# Patient Record
Sex: FEMALE | Race: WHITE | NOT HISPANIC OR LATINO | Employment: OTHER | URBAN - METROPOLITAN AREA
[De-identification: names, ages, dates, MRNs, and addresses within clinical notes are randomized per-mention and may not be internally consistent; named-entity substitution may affect disease eponyms.]

---

## 2022-10-01 ENCOUNTER — OFFICE VISIT (OUTPATIENT)
Dept: URGENT CARE | Facility: CLINIC | Age: 87
End: 2022-10-01
Payer: MEDICARE

## 2022-10-01 VITALS
BODY MASS INDEX: 24.19 KG/M2 | DIASTOLIC BLOOD PRESSURE: 66 MMHG | HEART RATE: 104 BPM | HEIGHT: 59 IN | TEMPERATURE: 99 F | RESPIRATION RATE: 20 BRPM | SYSTOLIC BLOOD PRESSURE: 157 MMHG | OXYGEN SATURATION: 98 % | WEIGHT: 120 LBS

## 2022-10-01 DIAGNOSIS — M25.511 ACUTE PAIN OF RIGHT SHOULDER: ICD-10-CM

## 2022-10-01 DIAGNOSIS — M54.2 CERVICAL PAIN (NECK): ICD-10-CM

## 2022-10-01 DIAGNOSIS — M77.8 SHOULDER TENDONITIS, RIGHT: Primary | ICD-10-CM

## 2022-10-01 DIAGNOSIS — R29.6 FALLS FREQUENTLY: ICD-10-CM

## 2022-10-01 PROCEDURE — 99214 PR OFFICE/OUTPT VISIT, EST, LEVL IV, 30-39 MIN: ICD-10-PCS | Mod: ,,, | Performed by: FAMILY MEDICINE

## 2022-10-01 PROCEDURE — 99214 OFFICE O/P EST MOD 30 MIN: CPT | Mod: ,,, | Performed by: FAMILY MEDICINE

## 2022-10-01 RX ORDER — DILTIAZEM HYDROCHLORIDE 360 MG/1
360 CAPSULE, EXTENDED RELEASE ORAL DAILY
COMMUNITY

## 2022-10-01 RX ORDER — FUROSEMIDE 10 MG/ML
SOLUTION ORAL
COMMUNITY

## 2022-10-01 RX ORDER — SERTRALINE HYDROCHLORIDE 100 MG/1
100 TABLET, FILM COATED ORAL NIGHTLY
COMMUNITY

## 2022-10-01 RX ORDER — PANTOPRAZOLE SODIUM 20 MG/1
20 TABLET, DELAYED RELEASE ORAL
COMMUNITY

## 2022-10-01 RX ORDER — KETOROLAC TROMETHAMINE 10 MG/1
10 TABLET, FILM COATED ORAL EVERY 6 HOURS PRN
Qty: 10 TABLET | Refills: 0 | Status: SHIPPED | OUTPATIENT
Start: 2022-10-01 | End: 2022-10-06

## 2022-10-01 RX ORDER — ATORVASTATIN CALCIUM 10 MG/1
10 TABLET, FILM COATED ORAL NIGHTLY
COMMUNITY

## 2022-10-01 NOTE — PROGRESS NOTES
Patient ID: Yoli Villarreal is a 89 y.o. female.  Chief Complaint: Fall (Pt fell Wednesday, fell backwards from standing up on hard floor, fell on back and pt says they believe they may have hit their head on the ground, shoulders are sore, right side is more sore than the left, voice is hoarse.  Pt took Advil. )    HPI:   Patient presents here today for above reason.     Here today in the care and presence of her daughter.   Patient reports pain, tenderness and decreased range of motion of the right shoulder.  Also upper back/cervical neck pain.    Goes on to express that she endured a accidental fall Wednesday of this past week.  She expresses that she does have a history of frequent falls typically falling forward however this time she fell backwards.  Believes that she landed on the grass.    She reports that she has not had any neurological changes/changes level of consciousness/changes of thought since the time of her injury.  Primary concern is her right upper extremity has the left upper extremity has improved/resolved.  Curious if anything is broken or dislocated etc..      Past Medical History:  Past Medical History:   Diagnosis Date    Acid reflux     High cholesterol     HTN (hypertension)     Unspecified macular degeneration      Past Surgical History:   Procedure Laterality Date    ADENOIDECTOMY      APPENDECTOMY       SECTION      HYSTERECTOMY      NEPHRECTOMY      TONSILLECTOMY       Review of patient's allergies indicates:   Allergen Reactions    Iodine      IVP reaction     Current Outpatient Medications on File Prior to Visit   Medication Sig Dispense Refill    atorvastatin (LIPITOR) 10 MG tablet Take 10 mg by mouth once daily. Dosage unsure.      diltiaZEM (TIAZAC) 360 MG Cs24 Take 360 mg by mouth once daily.      furosemide (LASIX) 10 mg/mL (alcohol free) solution Take by mouth. Dosage unsure      pantoprazole (PROTONIX) 20 MG tablet Take 20 mg by mouth once daily. Dosage unsure.       "sertraline (ZOLOFT) 100 MG tablet Take 100 mg by mouth once daily. Dosage unsure.       No current facility-administered medications on file prior to visit.     Social History     Socioeconomic History    Marital status:    Tobacco Use    Smoking status: Never    Smokeless tobacco: Never   Substance and Sexual Activity    Alcohol use: Never    Drug use: Never     Family History   Problem Relation Age of Onset    Ovarian cancer Mother     No Known Problems Father     No Known Problems Sister     No Known Problems Brother      ROS:   Review of Systems   Constitutional: Negative.    HENT: Negative.     Respiratory: Negative.     Cardiovascular: Negative.    Genitourinary: Negative.    Musculoskeletal:  Positive for back pain, joint swelling and neck pain.   Skin: Negative.    Neurological: Negative.    Psychiatric/Behavioral: Negative.       Vitals/PE:   BP (!) 157/66   Pulse 104   Temp 98.6 °F (37 °C)   Resp 20   Ht 4' 11" (1.499 m)   Wt 54.4 kg (120 lb)   SpO2 98%   BMI 24.24 kg/m²   Physical Exam  Vitals and nursing note reviewed.   Constitutional:       General: She is not in acute distress.     Appearance: Normal appearance. She is not ill-appearing, toxic-appearing or diaphoretic.   Cardiovascular:      Rate and Rhythm: Normal rate and regular rhythm.      Pulses: Normal pulses.      Heart sounds: Normal heart sounds.   Pulmonary:      Effort: Pulmonary effort is normal.      Breath sounds: Normal breath sounds.   Musculoskeletal:         General: Tenderness and signs of injury present.      Right shoulder: Tenderness present. Decreased range of motion.      Right upper arm: Tenderness present.      Comments: Right Shoulder. + bill, + Neer's.    Skin:     General: Skin is warm and dry.   Neurological:      Mental Status: She is alert and oriented to person, place, and time. Mental status is at baseline.   Psychiatric:         Mood and Affect: Mood normal.         Behavior: Behavior normal.        "  Thought Content: Thought content normal.       Assessment/Plan:   Shoulder tendonitis, right  Recommend Physical therapy.   Recommend the use of topical antiinflammatories ( biofreeze etc..).     Acute pain of right shoulder  -     X-ray Shoulder 2 or More Views Right; Future; Expected date: 10/01/2022  My interpretation of x-ray and shoulder no obvious dislocation/fracture.  Pain likely result of shoulder tendinitis as above.  Cervical pain (neck)  -     X-Ray Cervical Spine 2 or 3 Views; Future; Expected date: 10/01/2022  My interpretation of cervical x-ray C-spine positive for degenerative disc disease.  No obvious/new acute fractures etc..  Falls frequently   Fall precautions.  Both patient and daughter verbalized understanding.  Orders Placed This Encounter   Procedures    X-Ray Cervical Spine 2 or 3 Views    X-ray Shoulder 2 or More Views Right       Education and counseling done face to face regarding medical conditions and plan. Contact office if new symptoms develop. Should any symptoms ever significantly worsen seek emergency medical attention/go to ER. Follow up at least yearly for wellness or sooner PRN. Nurse to call patient with any results. The patient is receptive, expresses understanding and is agreeable to plan. All questions have been answered.  Follow up if symptoms worsen or fail to improve.

## 2023-05-23 ENCOUNTER — PATIENT MESSAGE (OUTPATIENT)
Dept: RESEARCH | Facility: HOSPITAL | Age: 88
End: 2023-05-23
Payer: MEDICARE

## 2023-12-22 ENCOUNTER — OFFICE VISIT (OUTPATIENT)
Dept: URGENT CARE | Facility: CLINIC | Age: 88
End: 2023-12-22
Payer: MEDICARE

## 2023-12-22 VITALS
BODY MASS INDEX: 24.19 KG/M2 | OXYGEN SATURATION: 96 % | DIASTOLIC BLOOD PRESSURE: 61 MMHG | HEART RATE: 68 BPM | RESPIRATION RATE: 18 BRPM | SYSTOLIC BLOOD PRESSURE: 179 MMHG | HEIGHT: 59 IN | TEMPERATURE: 98 F | WEIGHT: 120 LBS

## 2023-12-22 DIAGNOSIS — M54.9 BACK PAIN, UNSPECIFIED BACK LOCATION, UNSPECIFIED BACK PAIN LATERALITY, UNSPECIFIED CHRONICITY: Primary | ICD-10-CM

## 2023-12-22 PROCEDURE — 99213 OFFICE O/P EST LOW 20 MIN: CPT | Mod: ,,, | Performed by: NURSE PRACTITIONER

## 2023-12-22 PROCEDURE — 99213 PR OFFICE/OUTPT VISIT, EST, LEVL III, 20-29 MIN: ICD-10-PCS | Mod: ,,, | Performed by: NURSE PRACTITIONER

## 2023-12-22 NOTE — PROGRESS NOTES
"Subjective:      Patient ID: Yoli Villarreal is a 89 y.o. female.    Vitals:  height is 4' 11" (1.499 m) and weight is 54.4 kg (120 lb). Her temperature is 98.4 °F (36.9 °C). Her blood pressure is 179/61 (abnormal) and her pulse is 68. Her respiration is 18 and oxygen saturation is 96%.     Chief Complaint: Fall (Pt presents to clinic with pain in her lower back from a fall. Symptomatic since last night.)    This is a 89-year-old female presents to urgent care with complaints of mid lower back pain after suffering a fall on her hardwood floors last night.  Denies any numbness or tingling distally.  She denies any changes to bowel habits.  States only falling on her back last night.  Denies any other known injuries.    Musculoskeletal:  Positive for trauma, back pain, muscle ache and history of spine disorder.    Objective:     Physical Exam   Constitutional: She is oriented to person, place, and time. She appears well-developed. She is cooperative.  Non-toxic appearance. She does not appear ill. No distress.   HENT:   Head: Normocephalic and atraumatic.   Ears:   Right Ear: Hearing, tympanic membrane, external ear and ear canal normal.   Left Ear: Hearing, tympanic membrane, external ear and ear canal normal.   Nose: Nose normal. No mucosal edema, rhinorrhea or nasal deformity. No epistaxis. Right sinus exhibits no maxillary sinus tenderness and no frontal sinus tenderness. Left sinus exhibits no maxillary sinus tenderness and no frontal sinus tenderness.   Mouth/Throat: Uvula is midline, oropharynx is clear and moist and mucous membranes are normal. No trismus in the jaw. Normal dentition. No uvula swelling. No oropharyngeal exudate, posterior oropharyngeal edema or posterior oropharyngeal erythema.   Eyes: Conjunctivae and lids are normal. No scleral icterus.   Neck: Trachea normal and phonation normal. Neck supple. No edema present. No erythema present. No neck rigidity present.   Cardiovascular: Normal rate, " regular rhythm, normal heart sounds and normal pulses.   Pulmonary/Chest: Effort normal and breath sounds normal. No respiratory distress. She has no decreased breath sounds. She has no rhonchi.   Abdominal: Normal appearance.   Musculoskeletal: Normal range of motion.         General: No deformity. Normal range of motion.      Comments: No TTP elicited upon palpation their entirety of lower back and sacroiliac and pelvic area.  No signs of any deformity noted.   Neurological: She is alert and oriented to person, place, and time. She exhibits normal muscle tone. Coordination normal.   Skin: Skin is warm, dry, intact, not diaphoretic and not pale.   Psychiatric: Her speech is normal and behavior is normal. Judgment and thought content normal.   Nursing note and vitals reviewed.      Assessment:     1. Back pain, unspecified back location, unspecified back pain laterality, unspecified chronicity        Plan:   We will we will await final reading of x-ray of spine and coccyx  Patient will use prescribed tramadol as needed for pain along with warm compresses and monitor for any worsening signs of pain or issues.    Back pain, unspecified back location, unspecified back pain laterality, unspecified chronicity  -     XR SACRUM AND COCCYX; Future; Expected date: 12/22/2023  -     XR LUMBAR SPINE 2 OR 3 VIEWS; Future; Expected date: 12/22/2023

## 2023-12-22 NOTE — PATIENT INSTRUCTIONS
We will call with the final results of the x-ray of the spine.    As we discussed use your prescribed tramadol as needed for pain   Use warm compresses or heating pad to the lower back area.  Monitor for any worsening issues and please call with any questions or concerns.

## 2024-04-01 ENCOUNTER — HOSPITAL ENCOUNTER (INPATIENT)
Facility: HOSPITAL | Age: 89
LOS: 1 days | Discharge: HOME HEALTH | DRG: 281 | End: 2024-04-02
Attending: STUDENT IN AN ORGANIZED HEALTH CARE EDUCATION/TRAINING PROGRAM | Admitting: HOSPITALIST
Payer: MEDICARE

## 2024-04-01 DIAGNOSIS — R42 DIZZINESS: ICD-10-CM

## 2024-04-01 DIAGNOSIS — S92.352A DISPLACED FRACTURE OF FIFTH METATARSAL BONE, LEFT FOOT, INITIAL ENCOUNTER FOR CLOSED FRACTURE: ICD-10-CM

## 2024-04-01 DIAGNOSIS — R29.6 RECURRENT FALLS: Primary | ICD-10-CM

## 2024-04-01 DIAGNOSIS — R42 DIZZY: ICD-10-CM

## 2024-04-01 DIAGNOSIS — R79.89 ELEVATED TROPONIN: ICD-10-CM

## 2024-04-01 LAB
ALBUMIN SERPL-MCNC: 3.7 G/DL (ref 3.4–4.8)
ALBUMIN/GLOB SERPL: 1.3 RATIO (ref 1.1–2)
ALP SERPL-CCNC: 71 UNIT/L (ref 40–150)
ALT SERPL-CCNC: 11 UNIT/L (ref 0–55)
APPEARANCE UR: CLEAR
AST SERPL-CCNC: 17 UNIT/L (ref 5–34)
BACTERIA #/AREA URNS AUTO: NORMAL /HPF
BASOPHILS # BLD AUTO: 0.02 X10(3)/MCL
BASOPHILS NFR BLD AUTO: 0.5 %
BILIRUB SERPL-MCNC: 0.4 MG/DL
BILIRUB UR QL STRIP.AUTO: NEGATIVE
BUN SERPL-MCNC: 19.7 MG/DL (ref 9.8–20.1)
CALCIUM SERPL-MCNC: 9.4 MG/DL (ref 8.4–10.2)
CHLORIDE SERPL-SCNC: 109 MMOL/L (ref 98–111)
CO2 SERPL-SCNC: 22 MMOL/L (ref 23–31)
COLOR UR AUTO: COLORLESS
CREAT SERPL-MCNC: 0.91 MG/DL (ref 0.55–1.02)
EOSINOPHIL # BLD AUTO: 0.01 X10(3)/MCL (ref 0–0.9)
EOSINOPHIL NFR BLD AUTO: 0.2 %
ERYTHROCYTE [DISTWIDTH] IN BLOOD BY AUTOMATED COUNT: 15.9 % (ref 11.5–17)
GFR SERPLBLD CREATININE-BSD FMLA CKD-EPI: 57 MLS/MIN/1.73/M2
GLOBULIN SER-MCNC: 2.9 GM/DL (ref 2.4–3.5)
GLUCOSE SERPL-MCNC: 106 MG/DL (ref 75–121)
GLUCOSE UR QL STRIP.AUTO: NORMAL
HCT VFR BLD AUTO: 36 % (ref 37–47)
HGB BLD-MCNC: 11.4 G/DL (ref 12–16)
IMM GRANULOCYTES # BLD AUTO: 0.02 X10(3)/MCL (ref 0–0.04)
IMM GRANULOCYTES NFR BLD AUTO: 0.5 %
INR PPP: 1.2
KETONES UR QL STRIP.AUTO: NEGATIVE
LEUKOCYTE ESTERASE UR QL STRIP.AUTO: NEGATIVE
LYMPHOCYTES # BLD AUTO: 0.89 X10(3)/MCL (ref 0.6–4.6)
LYMPHOCYTES NFR BLD AUTO: 21.5 %
MCH RBC QN AUTO: 28.3 PG (ref 27–31)
MCHC RBC AUTO-ENTMCNC: 31.7 G/DL (ref 33–36)
MCV RBC AUTO: 89.3 FL (ref 80–94)
MONOCYTES # BLD AUTO: 1.28 X10(3)/MCL (ref 0.1–1.3)
MONOCYTES NFR BLD AUTO: 30.9 %
NEUTROPHILS # BLD AUTO: 1.92 X10(3)/MCL (ref 2.1–9.2)
NEUTROPHILS NFR BLD AUTO: 46.4 %
NITRITE UR QL STRIP.AUTO: NEGATIVE
NRBC BLD AUTO-RTO: 0 %
PH UR STRIP.AUTO: 7 [PH]
PLATELET # BLD AUTO: 110 X10(3)/MCL (ref 130–400)
PLATELETS.RETICULATED NFR BLD AUTO: 6.4 % (ref 0.9–11.2)
PMV BLD AUTO: 12.1 FL (ref 7.4–10.4)
POTASSIUM SERPL-SCNC: 4.3 MMOL/L (ref 3.5–5.1)
PROT SERPL-MCNC: 6.6 GM/DL (ref 5.8–7.6)
PROT UR QL STRIP.AUTO: NEGATIVE
PROTHROMBIN TIME: 15.1 SECONDS (ref 12.5–14.5)
RBC # BLD AUTO: 4.03 X10(6)/MCL (ref 4.2–5.4)
RBC #/AREA URNS AUTO: NORMAL /HPF
RBC UR QL AUTO: NEGATIVE
SODIUM SERPL-SCNC: 140 MMOL/L (ref 132–146)
SP GR UR STRIP.AUTO: 1.01 (ref 1–1.03)
SQUAMOUS #/AREA URNS LPF: NORMAL /HPF
TROPONIN I SERPL-MCNC: 0.04 NG/ML (ref 0–0.04)
TROPONIN I SERPL-MCNC: 0.06 NG/ML (ref 0–0.04)
TROPONIN I SERPL-MCNC: 0.07 NG/ML (ref 0–0.04)
UROBILINOGEN UR STRIP-ACNC: NORMAL
WBC # SPEC AUTO: 4.14 X10(3)/MCL (ref 4.5–11.5)
WBC #/AREA URNS AUTO: NORMAL /HPF

## 2024-04-01 PROCEDURE — 93005 ELECTROCARDIOGRAM TRACING: CPT

## 2024-04-01 PROCEDURE — 25000003 PHARM REV CODE 250: Performed by: HOSPITALIST

## 2024-04-01 PROCEDURE — 85025 COMPLETE CBC W/AUTO DIFF WBC: CPT | Performed by: STUDENT IN AN ORGANIZED HEALTH CARE EDUCATION/TRAINING PROGRAM

## 2024-04-01 PROCEDURE — 27000221 HC OXYGEN, UP TO 24 HOURS

## 2024-04-01 PROCEDURE — 93010 ELECTROCARDIOGRAM REPORT: CPT | Mod: ,,, | Performed by: INTERNAL MEDICINE

## 2024-04-01 PROCEDURE — 63600175 PHARM REV CODE 636 W HCPCS: Performed by: HOSPITALIST

## 2024-04-01 PROCEDURE — 11000001 HC ACUTE MED/SURG PRIVATE ROOM

## 2024-04-01 PROCEDURE — 96360 HYDRATION IV INFUSION INIT: CPT

## 2024-04-01 PROCEDURE — 85610 PROTHROMBIN TIME: CPT | Performed by: STUDENT IN AN ORGANIZED HEALTH CARE EDUCATION/TRAINING PROGRAM

## 2024-04-01 PROCEDURE — 25000003 PHARM REV CODE 250: Performed by: STUDENT IN AN ORGANIZED HEALTH CARE EDUCATION/TRAINING PROGRAM

## 2024-04-01 PROCEDURE — 99285 EMERGENCY DEPT VISIT HI MDM: CPT | Mod: 25

## 2024-04-01 PROCEDURE — 99223 1ST HOSP IP/OBS HIGH 75: CPT | Mod: ,,, | Performed by: ORTHOPAEDIC SURGERY

## 2024-04-01 PROCEDURE — 84484 ASSAY OF TROPONIN QUANT: CPT | Performed by: STUDENT IN AN ORGANIZED HEALTH CARE EDUCATION/TRAINING PROGRAM

## 2024-04-01 PROCEDURE — 80053 COMPREHEN METABOLIC PANEL: CPT | Performed by: STUDENT IN AN ORGANIZED HEALTH CARE EDUCATION/TRAINING PROGRAM

## 2024-04-01 PROCEDURE — 84484 ASSAY OF TROPONIN QUANT: CPT | Performed by: NURSE PRACTITIONER

## 2024-04-01 PROCEDURE — 51702 INSERT TEMP BLADDER CATH: CPT

## 2024-04-01 PROCEDURE — 94760 N-INVAS EAR/PLS OXIMETRY 1: CPT

## 2024-04-01 PROCEDURE — 81001 URINALYSIS AUTO W/SCOPE: CPT | Performed by: STUDENT IN AN ORGANIZED HEALTH CARE EDUCATION/TRAINING PROGRAM

## 2024-04-01 PROCEDURE — 63600175 PHARM REV CODE 636 W HCPCS: Performed by: STUDENT IN AN ORGANIZED HEALTH CARE EDUCATION/TRAINING PROGRAM

## 2024-04-01 RX ORDER — DOCUSATE SODIUM 100 MG/1
100 CAPSULE, LIQUID FILLED ORAL 2 TIMES DAILY PRN
Status: DISCONTINUED | OUTPATIENT
Start: 2024-04-01 | End: 2024-04-01

## 2024-04-01 RX ORDER — MELOXICAM 7.5 MG/1
7.5 TABLET ORAL 2 TIMES DAILY
COMMUNITY

## 2024-04-01 RX ORDER — ACETAMINOPHEN 325 MG/1
650 TABLET ORAL EVERY 4 HOURS PRN
Status: DISCONTINUED | OUTPATIENT
Start: 2024-04-01 | End: 2024-04-02 | Stop reason: HOSPADM

## 2024-04-01 RX ORDER — ATORVASTATIN CALCIUM 10 MG/1
10 TABLET, FILM COATED ORAL DAILY
Status: DISCONTINUED | OUTPATIENT
Start: 2024-04-01 | End: 2024-04-02 | Stop reason: HOSPADM

## 2024-04-01 RX ORDER — SULFAMETHOXAZOLE AND TRIMETHOPRIM 800; 160 MG/1; MG/1
1 TABLET ORAL 2 TIMES DAILY
Status: DISCONTINUED | OUTPATIENT
Start: 2024-04-01 | End: 2024-04-02 | Stop reason: HOSPADM

## 2024-04-01 RX ORDER — POLYETHYLENE GLYCOL 3350 17 G/17G
17 POWDER, FOR SOLUTION ORAL 2 TIMES DAILY PRN
Status: DISCONTINUED | OUTPATIENT
Start: 2024-04-01 | End: 2024-04-02 | Stop reason: HOSPADM

## 2024-04-01 RX ORDER — DILTIAZEM HYDROCHLORIDE 180 MG/1
360 CAPSULE, COATED, EXTENDED RELEASE ORAL DAILY
Status: DISCONTINUED | OUTPATIENT
Start: 2024-04-01 | End: 2024-04-02 | Stop reason: HOSPADM

## 2024-04-01 RX ORDER — ONDANSETRON HYDROCHLORIDE 2 MG/ML
4 INJECTION, SOLUTION INTRAVENOUS EVERY 8 HOURS PRN
Status: DISCONTINUED | OUTPATIENT
Start: 2024-04-01 | End: 2024-04-02 | Stop reason: HOSPADM

## 2024-04-01 RX ORDER — MECLIZINE HCL 12.5 MG 12.5 MG/1
12.5 TABLET ORAL 3 TIMES DAILY PRN
Status: DISCONTINUED | OUTPATIENT
Start: 2024-04-01 | End: 2024-04-02 | Stop reason: HOSPADM

## 2024-04-01 RX ORDER — PANTOPRAZOLE SODIUM 20 MG/1
20 TABLET, DELAYED RELEASE ORAL
Status: DISCONTINUED | OUTPATIENT
Start: 2024-04-01 | End: 2024-04-02 | Stop reason: HOSPADM

## 2024-04-01 RX ORDER — HYDRALAZINE HYDROCHLORIDE 20 MG/ML
10 INJECTION INTRAMUSCULAR; INTRAVENOUS EVERY 4 HOURS PRN
Status: DISCONTINUED | OUTPATIENT
Start: 2024-04-01 | End: 2024-04-02 | Stop reason: HOSPADM

## 2024-04-01 RX ORDER — POLYETHYLENE GLYCOL 3350 17 G/17G
17 POWDER, FOR SOLUTION ORAL 2 TIMES DAILY PRN
Status: DISCONTINUED | OUTPATIENT
Start: 2024-04-01 | End: 2024-04-01

## 2024-04-01 RX ORDER — LABETALOL HYDROCHLORIDE 5 MG/ML
10 INJECTION, SOLUTION INTRAVENOUS EVERY 4 HOURS PRN
Status: DISCONTINUED | OUTPATIENT
Start: 2024-04-01 | End: 2024-04-02 | Stop reason: HOSPADM

## 2024-04-01 RX ORDER — ASPIRIN 325 MG
325 TABLET, DELAYED RELEASE (ENTERIC COATED) ORAL
Status: COMPLETED | OUTPATIENT
Start: 2024-04-01 | End: 2024-04-01

## 2024-04-01 RX ORDER — DOCUSATE SODIUM 100 MG/1
100 CAPSULE, LIQUID FILLED ORAL 2 TIMES DAILY PRN
Status: DISCONTINUED | OUTPATIENT
Start: 2024-04-01 | End: 2024-04-02 | Stop reason: HOSPADM

## 2024-04-01 RX ORDER — TRAMADOL HYDROCHLORIDE 50 MG/1
50 TABLET ORAL EVERY 6 HOURS PRN
Status: DISCONTINUED | OUTPATIENT
Start: 2024-04-01 | End: 2024-04-02 | Stop reason: HOSPADM

## 2024-04-01 RX ORDER — TRAMADOL HYDROCHLORIDE 100 MG/1
50 TABLET, EXTENDED RELEASE ORAL DAILY
Status: ON HOLD | COMMUNITY
End: 2024-06-09 | Stop reason: HOSPADM

## 2024-04-01 RX ORDER — HYDRALAZINE HYDROCHLORIDE 20 MG/ML
10 INJECTION INTRAMUSCULAR; INTRAVENOUS EVERY 4 HOURS PRN
Status: DISCONTINUED | OUTPATIENT
Start: 2024-04-01 | End: 2024-04-01

## 2024-04-01 RX ORDER — SULFAMETHOXAZOLE AND TRIMETHOPRIM 800; 160 MG/1; MG/1
1 TABLET ORAL 2 TIMES DAILY
Status: ON HOLD | COMMUNITY
Start: 2024-03-28 | End: 2024-06-07 | Stop reason: HOSPADM

## 2024-04-01 RX ORDER — MECLIZINE HYDROCHLORIDE 50 MG/1
5 TABLET ORAL EVERY 12 HOURS PRN
COMMUNITY

## 2024-04-01 RX ORDER — SERTRALINE HYDROCHLORIDE 50 MG/1
100 TABLET, FILM COATED ORAL DAILY
Status: DISCONTINUED | OUTPATIENT
Start: 2024-04-01 | End: 2024-04-02 | Stop reason: HOSPADM

## 2024-04-01 RX ORDER — ACETAMINOPHEN 325 MG/1
650 TABLET ORAL EVERY 8 HOURS PRN
Status: DISCONTINUED | OUTPATIENT
Start: 2024-04-01 | End: 2024-04-02 | Stop reason: HOSPADM

## 2024-04-01 RX ADMIN — POLYETHYLENE GLYCOL 3350 17 G: 17 POWDER, FOR SOLUTION ORAL at 01:04

## 2024-04-01 RX ADMIN — PANTOPRAZOLE SODIUM 20 MG: 20 TABLET, DELAYED RELEASE ORAL at 04:04

## 2024-04-01 RX ADMIN — MECLIZINE 12.5 MG: 12.5 TABLET ORAL at 04:04

## 2024-04-01 RX ADMIN — SULFAMETHOXAZOLE AND TRIMETHOPRIM 1 TABLET: 800; 160 TABLET ORAL at 11:04

## 2024-04-01 RX ADMIN — DILTIAZEM HYDROCHLORIDE 360 MG: 180 CAPSULE, COATED, EXTENDED RELEASE ORAL at 11:04

## 2024-04-01 RX ADMIN — ATORVASTATIN CALCIUM 10 MG: 10 TABLET, FILM COATED ORAL at 09:04

## 2024-04-01 RX ADMIN — TRAMADOL HYDROCHLORIDE 50 MG: 50 TABLET, COATED ORAL at 06:04

## 2024-04-01 RX ADMIN — SODIUM CHLORIDE, POTASSIUM CHLORIDE, SODIUM LACTATE AND CALCIUM CHLORIDE 1000 ML: 600; 310; 30; 20 INJECTION, SOLUTION INTRAVENOUS at 06:04

## 2024-04-01 RX ADMIN — SULFAMETHOXAZOLE AND TRIMETHOPRIM 1 TABLET: 800; 160 TABLET ORAL at 09:04

## 2024-04-01 RX ADMIN — HYDRALAZINE HYDROCHLORIDE 10 MG: 20 INJECTION INTRAMUSCULAR; INTRAVENOUS at 01:04

## 2024-04-01 RX ADMIN — SERTRALINE HYDROCHLORIDE 100 MG: 50 TABLET ORAL at 09:04

## 2024-04-01 RX ADMIN — DOCUSATE SODIUM 100 MG: 100 CAPSULE, LIQUID FILLED ORAL at 01:04

## 2024-04-01 RX ADMIN — ASPIRIN 325 MG: 325 TABLET, COATED ORAL at 08:04

## 2024-04-01 NOTE — ED PROVIDER NOTES
Encounter Date: 2024    SCRIBE #1 NOTE: I, Domi Knight, am scribing for, and in the presence of,  Phillip Villagran MD. I have scribed the following portions of the note - Other sections scribed: HPI, ROS and physical.       History     Chief Complaint   Patient presents with    Knee Pain     Per pts daughter she has been falling a lot recently. Pt has hx of vertigo. Pt fell today and hurt her knees. Denies hitting her head when she falls. No blood thinners. No LOC. GCS 14 at baseline.      This is a 89 y/o female with a medical hx of HTN and HLD that presents to the ED via EMS for knee pain s/p fall. Pt reports that recently she is been having more frequent falls; she states that the falls occur after she stands up she becomes dizzy. Pt states that she was seen at [facility] recently for a fall which cause a laceration above her R eyebrow. Pt states that she had a fall today and is now having pain in her L knee. Denies hitting head, LOC. Reports fall, dizziness, L knee pain.     Pt is not on blood thinners.     PCP: John Kulkarni MD    The history is provided by the patient. No  was used.     Review of patient's allergies indicates:   Allergen Reactions    Iodine      IVP reaction     Past Medical History:   Diagnosis Date    Acid reflux     High cholesterol     HTN (hypertension)     Unspecified macular degeneration      Past Surgical History:   Procedure Laterality Date    ADENOIDECTOMY      APPENDECTOMY       SECTION      HYSTERECTOMY      NEPHRECTOMY      TONSILLECTOMY       Family History   Problem Relation Age of Onset    Ovarian cancer Mother     No Known Problems Father     No Known Problems Sister     No Known Problems Brother      Social History     Tobacco Use    Smoking status: Never    Smokeless tobacco: Never   Substance Use Topics    Alcohol use: Never    Drug use: Never     Review of Systems   Constitutional:  Negative for chills and fever.   HENT:  Negative for  congestion, drooling and sore throat.    Eyes:  Negative for pain and visual disturbance.   Respiratory:  Negative for chest tightness, shortness of breath and wheezing.    Cardiovascular:  Negative for chest pain, palpitations and leg swelling.   Gastrointestinal:  Negative for abdominal pain, nausea and vomiting.   Genitourinary:  Negative for dysuria and hematuria.   Musculoskeletal:  Negative for back pain, neck pain and neck stiffness.        Positive L knee pain      Skin:  Negative for pallor and rash.   Neurological:  Positive for dizziness. Negative for weakness and numbness.        Negative LOC   Hematological:  Does not bruise/bleed easily.       Physical Exam     Initial Vitals [04/01/24 0316]   BP Pulse Resp Temp SpO2   (!) 142/56 94 (!) 24 96.8 °F (36 °C) 97 %      MAP       --         Physical Exam    Nursing note and vitals reviewed.  Constitutional: She appears well-developed and well-nourished. She is not diaphoretic. No distress.   Hard of hearing  Follows commands   Appears at baseline   HENT:   Head: Normocephalic and atraumatic.   Nose: Nose normal.   Mouth/Throat: Oropharynx is clear and moist.   Eyes: EOM are normal. Pupils are equal, round, and reactive to light.   Neck: Neck supple.   Normal range of motion.  Cardiovascular:  Normal rate and regular rhythm.           No murmur heard.  Pulmonary/Chest: Breath sounds normal. No respiratory distress. She has no wheezes. She has no rales.   NC in place    Abdominal: Abdomen is soft. She exhibits no distension. There is no abdominal tenderness.   Musculoskeletal:      Cervical back: Normal range of motion and neck supple.      Left knee: Swelling (mild swellign to L knee) present.      Comments: Old appearing bruising to L foot     Neurological: She is alert and oriented to person, place, and time. She has normal strength. No cranial nerve deficit or sensory deficit.   Skin: Skin is warm. Capillary refill takes less than 2 seconds. No rash  noted.         ED Course   Critical Care    Date/Time: 4/1/2024 12:31 PM    Performed by: Phillip Villagran MD  Authorized by: Phillip Villagran MD  Direct patient critical care time: 35 minutes  Total critical care time (exclusive of procedural time) : 35 minutes  Critical care time was exclusive of separately billable procedures and treating other patients.  Critical care was necessary to treat or prevent imminent or life-threatening deterioration of the following conditions: cardiac failure.  Critical care was time spent personally by me on the following activities: discussions with consultants, development of treatment plan with patient or surrogate, evaluation of patient's response to treatment, ordering and performing treatments and interventions, obtaining history from patient or surrogate, examination of patient, ordering and review of laboratory studies, ordering and review of radiographic studies, pulse oximetry and re-evaluation of patient's condition.        Labs Reviewed   COMPREHENSIVE METABOLIC PANEL - Abnormal; Notable for the following components:       Result Value    Carbon Dioxide 22 (*)     All other components within normal limits   TROPONIN I - Abnormal; Notable for the following components:    Troponin-I 0.062 (*)     All other components within normal limits   PROTIME-INR - Abnormal; Notable for the following components:    PT 15.1 (*)     All other components within normal limits   CBC WITH DIFFERENTIAL - Abnormal; Notable for the following components:    WBC 4.14 (*)     RBC 4.03 (*)     Hgb 11.4 (*)     Hct 36.0 (*)     MCHC 31.7 (*)     Platelet 110 (*)     MPV 12.1 (*)     Neut # 1.92 (*)     All other components within normal limits   URINALYSIS, REFLEX TO URINE CULTURE - Normal   CBC W/ AUTO DIFFERENTIAL    Narrative:     The following orders were created for panel order CBC auto differential.  Procedure                               Abnormality         Status                      ---------                               -----------         ------                     CBC with Differential[[id number]]       Abnormal            Final result                 Please view results for these tests on the individual orders.   TROPONIN I   POCT GLUCOSE MONITORING CONTINUOUS          Imaging Results              X-Ray Foot Complete Left (Final result)  Result time 04/01/24 12:14:03      Final result by León De La O MD (04/01/24 12:14:03)                   Impression:      Fracture of the 5th metatarsal      Electronically signed by: León De La O MD  Date:    04/01/2024  Time:    12:14               Narrative:    EXAMINATION:  XR FOOT COMPLETE 3 VIEW LEFT    CLINICAL HISTORY:  s/p fall;.    TECHNIQUE:  AP, lateral and oblique views of the left foot were performed.    COMPARISON:  None    FINDINGS:  There is a fracture of the distal end of the 5th metatarsal.  Fracture is angulated and  displaced.  No other fractures are seen.                                       X-Ray Knee Complete 4 Or More Views Right (Final result)  Result time 04/01/24 08:19:23      Final result by Lan Magaña MD (04/01/24 08:19:23)                   Impression:      No acute osseous abnormality identified.      Electronically signed by: Lan Magaña  Date:    04/01/2024  Time:    08:19               Narrative:    EXAMINATION:  XR KNEE COMP 4 OR MORE VIEWS RIGHT    CLINICAL HISTORY:  pain; fall    TECHNIQUE:  Four views    COMPARISON:  None available.    FINDINGS:  The osseous and articular surfaces are unremarkable.  There is no acute fracture, dislocation or arthritic change.                                       X-Ray Knee Complete 4 or More Views Left (Final result)  Result time 04/01/24 08:20:30      Final result by Lan Magaña MD (04/01/24 08:20:30)                   Impression:      No acute osseous abnormality identified.      Electronically signed by: Lan Magaña  Date:    04/01/2024  Time:    08:20                Narrative:    EXAMINATION:  XR KNEE COMP 4 OR MORE VIEWS LEFT    CLINICAL HISTORY:  pain;    TECHNIQUE:  Four views    COMPARISON:  None available.    FINDINGS:  There is moderate degenerative narrowing of the medial compartment of the left knee.  Articular surfaces alignment is unremarkable.  No acute fracture or dislocation identified.                                       X-Ray Chest AP Portable (Final result)  Result time 04/01/24 07:21:44      Final result by Niall Mcneill MD (04/01/24 07:21:44)                   Impression:      No acute cardiopulmonary process.      Electronically signed by: Niall Mcneill  Date:    04/01/2024  Time:    07:21               Narrative:    EXAMINATION:  XR CHEST AP PORTABLE    CLINICAL HISTORY:  Dizziness and giddiness    TECHNIQUE:  Single view of the chest    COMPARISON:  No prior imaging available for comparison.    FINDINGS:  Prominent interstitial markings with no focal opacification.    The cardiomediastinal silhouette remains prominent.    No acute osseous abnormality.                                       CT Cervical Spine Without Contrast (Final result)  Result time 04/01/24 06:57:25      Final result by Lan Magaña MD (04/01/24 06:57:25)                   Impression:      No acute fracture or malalignment identified.      Electronically signed by: Lan Magaña  Date:    04/01/2024  Time:    06:57               Narrative:    EXAMINATION:  CT CERVICAL SPINE WITHOUT CONTRAST    CLINICAL HISTORY:  Trauma.    TECHNIQUE:  Multidetector axial images were performed of the cervical spine without and.  Images were reconstructed.    Automated exposure control was utilized to minimize radiation dose.  .    COMPARISON:  None available.    FINDINGS:  Cervical vertebrae stature is maintained.  There is no significant listhesis..  No acute fracture or malalignment identified.  There are multilevel degenerative changes which are most apparent at the level of  C5-C6.  At C5-C6, there is ventral impression upon the thecal sac by ventral osteophyte ridging and moderate narrowing of the right neural foramen and marked narrowing of the left neural foramen..  There is no prevertebral soft tissue prominence.    This study does not exclude the possibility of intrathecal soft tissue, ligamentous or vascular injury.                                       CT Head Without Contrast (Final result)  Result time 04/01/24 06:54:07      Final result by Lan Magaña MD (04/01/24 06:54:07)                   Impression:      No acute intracranial traumatic findings identified.      Electronically signed by: Lan Magaña  Date:    04/01/2024  Time:    06:54               Narrative:    EXAMINATION:  CT HEAD WITHOUT CONTRAST    CLINICAL HISTORY:  Dizziness, persistent/recurrent, cardiac or vascular cause suspected;    TECHNIQUE:  Sequential axial images were performed of the brain without contrast.    Dose product length of 906 mGycm. Automated exposure control was utilized to minimize radiation dose.    COMPARISON:  None available.    FINDINGS:  There is no intracranial mass effect, midline shift, hydrocephalus or hemorrhage. There is no sulcal effacement or low attenuation changes to suggest recent large vessel territory infarction. Chronic appearing periventricular and subcortical white matter low attenuation changes are present and are consistent with marked chronic microangiopathic ischemia. The ventricular system and sulcal markings prominence is consistent with atrophy. There is no acute extra axial fluid collection.  There is no acute depressed calvarial fracture.  Visualized paranasal sinuses are clear without mucosal thickening, polypoidal abnormality or air-fluid levels. Mastoid air cells aeration is optimal.                                       Medications   ondansetron injection 4 mg (has no administration in time range)   acetaminophen tablet 650 mg (has no administration in  time range)   acetaminophen tablet 650 mg (has no administration in time range)   labetaloL injection 10 mg (has no administration in time range)   atorvastatin tablet 10 mg (has no administration in time range)   diltiaZEM 24 hr capsule 360 mg (360 mg Oral Given 4/1/24 1158)   meclizine tablet 12.5 mg (has no administration in time range)   pantoprazole EC tablet 20 mg (has no administration in time range)   sertraline tablet 100 mg (has no administration in time range)   sulfamethoxazole-trimethoprim 800-160mg per tablet 1 tablet (1 tablet Oral Given 4/1/24 1158)   traMADoL tablet 50 mg (has no administration in time range)   hydrALAZINE injection 10 mg (has no administration in time range)   lactated ringers bolus 1,000 mL (0 mLs Intravenous Stopped 4/1/24 0755)   aspirin EC tablet 325 mg (325 mg Oral Given 4/1/24 0800)     Differential diagnosis (includes but is not limited to):   TBI, skull injury, ICH, syncope, mechanical fall, fracture, dislocation, abrasion, contusion, dehydration, kidney injury, ACS, arrhythmia, electrolyte abnormalities    MDM Narrative  89-year-old female presents for evaluation after multiple falls at home.  Patient states she has been getting dizzy and falling.  She does have a history of vertigo.  CT scan pending.  Unclear if she passed out during, before, or after a fall today.  Labs are pending.  EKG reviewed.  Pain and nausea control as needed.  X-rays pending at sites of pain.    Update:  CTs and x-ray reviewed, no acute traumatic injury identified.  Labs reviewed, troponin mildly elevated.  Aspirin ordered.  Will admit for observation, telemetry monitoring, ACS rule out, cardiology evaluation.  Cardiology consulted, appreciate recommendations.  Case discussed with the hospitalist, will admit.    Dispo: Admit    My independent radiology interpretation: as above  Point of care US (independently performed and interpreted):   Decision rules/clinical scoring:     Sepsis Perfusion  Assessment:     Amount and/or Complexity of Data Reviewed  Independent historian:    Summary of history:   External data reviewed: notes from previous ED visits and notes from clinic visits  Summary of data reviewed: Prior records reviewed  Risk and benefits of testing: discussed   Labs: ordered and reviewed  Radiology: ordered and independent interpretation performed (see above or ED course)  ECG/medicine tests: ordered and independent interpretation performed (see above or ED course)  Discussion of management or test interpretation with external provider(s): discussed with hospitalist physician and discussed with cardiology consultant   Summary of discussion: as above    Risk  Parenteral controlled substances   Drug therapy requiring intense monitoring for toxicity   Decision regarding hospitalization  Shared decision making     Critical Care  30-74 minutes     Data Reviewed/Counseling: I have personally reviewed the patient's vital signs, nursing notes, and other relevant tests, information, and imaging. I had a detailed discussion regarding the historical points, exam findings, and any diagnostic results supporting the discharge diagnosis. I personally performed the history, PE, MDM and procedures as documented above and agree with the scribe's documentation.    Portions of this note were dictated using voice recognition software. Although it was reviewed for accuracy, some inherent voice recognition errors may have occurred and may be present in this document.           Scribe Attestation:   Scribe #1: I performed the above scribed service and the documentation accurately describes the services I performed. I attest to the accuracy of the note.    Attending Attestation:           Physician Attestation for Scribe:  Physician Attestation Statement for Scribe #1: I, Phillip Villagran MD, reviewed documentation, as scribed by Domi Knight in my presence, and it is both accurate and complete.             ED  Course as of 04/01/24 1232   Mon Apr 01, 2024   0637 EKG independently interpreted by me.  EKG: NSR @ 75, no STEMI, Qtc 477, RBBB []   0734 X-Ray Chest AP Portable  Independently visualized/reviewed by me during the ED visit.  - No acute lobar consolidation, no PTX []   0904 Paged hospitalist    []      ED Course User Index  [] Phillip Villagran MD  [] Domi Knight                           Clinical Impression:  Final diagnoses:  [R42] Dizzy  [R42] Dizziness  [R79.89] Elevated troponin (Primary)  [R29.6] Recurrent falls          ED Disposition Condition    Admit Stable                Phillip Villagran MD  04/01/24 1232

## 2024-04-01 NOTE — PROGRESS NOTES
[facility] - Emergency Dept    Cardiology  Consult Note    Patient Name: Yoli Villarreal  MRN: [id number]  Admission Date: 2024  Hospital Length of Stay: 0 days  Code Status: Full Code   Attending Provider: Dhiraj Cavanaugh MD   Consulting Provider: JOHNATHAN Nunez  Primary Care Physician: Nacho Kulkarni MD  Principal Problem:<principal problem not specified>    Patient information was obtained from patient, past medical records, and ER records.     Subjective:     Reason for Consult: NSTEMI    HPI: Ms. Villarreal is a 89 year old female who is unknown to [facility]. She presents to the ER via EMS with complaints of left knee pain after a fall that occurred prior to arrival. She reports episodes of dizziness and has fallen 4 x over the last 3 months. She denies CP, SOB, palpitations, nausea or vomiting. Significant labs include Plt 110 & trop 0.062. Left foot x ray demonstrates fracture of the 5th metatarsal. An EKG was obtained and demonstrated NSR w/ RBBB. [facility] has been consulted to further evaluate the patient's elevated troponin.     PMH: HTN, HLD, GERD, vertigo, macular degeneration   PSH: adenoidectomy, appendectomy, , hysterectomy, nephrectomy, tonsillectomy   Family History: Denies  Social History: Denies alcohol, tobacco, or illicit drug use.     Previous Cardiac Diagnostics:   None to review.     Review of patient's allergies indicates:   Allergen Reactions    Iodine      IVP reaction     No current facility-administered medications on file prior to encounter.     Current Outpatient Medications on File Prior to Encounter   Medication Sig    atorvastatin (LIPITOR) 10 MG tablet Take 10 mg by mouth every evening. Dosage unsure.    diltiaZEM (TIAZAC) 360 MG Cs24 Take 360 mg by mouth once daily.    furosemide (LASIX) 10 mg/mL (alcohol free) solution Take by mouth. Dosage unsure    meloxicam (MOBIC) 7.5 MG tablet Take 7.5 mg by mouth once daily.    pantoprazole (PROTONIX) 20 MG tablet  Take 20 mg by mouth 2 (two) times daily before meals. Dosage unsure.    sertraline (ZOLOFT) 100 MG tablet Take 100 mg by mouth every evening. Dosage unsure.    sulfamethoxazole-trimethoprim 800-160mg (BACTRIM DS) 800-160 mg Tab Take 1 tablet by mouth 2 (two) times daily.    traMADoL (ULTRAM-ER) 100 MG Tb24 Take 50 mg by mouth once daily.    meclizine (ANTIVERT) 50 MG tablet Take 5 mg by mouth every 12 (twelve) hours as needed.     Review of Systems   Constitutional:  Positive for fatigue.   Respiratory:  Negative for shortness of breath.    Cardiovascular:  Negative for chest pain and palpitations.   Musculoskeletal:         Left knee pain    Neurological:  Positive for dizziness and weakness.   Hematological:  Adenopathy: trend troponins..       Objective:     Vital Signs (Most Recent):  Temp: 98.8 °F (37.1 °C) (04/01/24 0625)  Pulse: 93 (04/01/24 1126)  Resp: (!) 25 (04/01/24 1126)  BP: (!) 177/106 (04/01/24 1126)  SpO2: 96 % (04/01/24 1126) Vital Signs (24h Range):  Temp:  [96.8 °F (36 °C)-98.8 °F (37.1 °C)] 98.8 °F (37.1 °C)  Pulse:  [73-94] 93  Resp:  [14-25] 25  SpO2:  [93 %-97 %] 96 %  BP: (142-179)/() 177/106   Weight: 54.4 kg (119 lb 14.9 oz)  Body mass index is 24.22 kg/m².  SpO2: 96 %       Intake/Output Summary (Last 24 hours) at 4/1/2024 1247  Last data filed at 4/1/2024 0755  Gross per 24 hour   Intake 1000 ml   Output --   Net 1000 ml     Lines/Drains/Airways       Drain  Duration                  Urethral Catheter 04/01/24 0757 Straight-tip 16 Fr. <1 day              Peripheral Intravenous Line  Duration                  Peripheral IV - Single Lumen 04/01/24 0330 20 G Distal;Posterior;Right Forearm <1 day                  Significant Labs:  Recent Results (from the past 72 hour(s))   Comprehensive metabolic panel    Collection Time: 04/01/24  7:09 AM   Result Value Ref Range    Sodium Level 140 132 - 146 mmol/L    Potassium Level 4.3 3.5 - 5.1 mmol/L    Chloride 109 98 - 111 mmol/L    Carbon  Dioxide 22 (L) 23 - 31 mmol/L    Glucose Level 106 75 - 121 mg/dL    Blood Urea Nitrogen 19.7 9.8 - 20.1 mg/dL    Creatinine 0.91 0.55 - 1.02 mg/dL    Calcium Level Total 9.4 8.4 - 10.2 mg/dL    Protein Total 6.6 5.8 - 7.6 gm/dL    Albumin Level 3.7 3.4 - 4.8 g/dL    Globulin 2.9 2.4 - 3.5 gm/dL    Albumin/Globulin Ratio 1.3 1.1 - 2.0 ratio    Bilirubin Total 0.4 <=1.5 mg/dL    Alkaline Phosphatase 71 40 - 150 unit/L    Alanine Aminotransferase 11 0 - 55 unit/L    Aspartate Aminotransferase 17 5 - 34 unit/L    eGFR 57 mls/min/1.73/m2   Troponin I    Collection Time: 04/01/24  7:09 AM   Result Value Ref Range    Troponin-I 0.062 (H) 0.000 - 0.045 ng/mL   Protime-INR    Collection Time: 04/01/24  7:09 AM   Result Value Ref Range    PT 15.1 (H) 12.5 - 14.5 seconds    INR 1.2 <=1.3   CBC with Differential    Collection Time: 04/01/24  7:09 AM   Result Value Ref Range    WBC 4.14 (L) 4.50 - 11.50 x10(3)/mcL    RBC 4.03 (L) 4.20 - 5.40 x10(6)/mcL    Hgb 11.4 (L) 12.0 - 16.0 g/dL    Hct 36.0 (L) 37.0 - 47.0 %    MCV 89.3 80.0 - 94.0 fL    MCH 28.3 27.0 - 31.0 pg    MCHC 31.7 (L) 33.0 - 36.0 g/dL    RDW 15.9 11.5 - 17.0 %    Platelet 110 (L) 130 - 400 x10(3)/mcL    MPV 12.1 (H) 7.4 - 10.4 fL    Neut % 46.4 %    Lymph % 21.5 %    Mono % 30.9 %    Eos % 0.2 %    Basophil % 0.5 %    Lymph # 0.89 0.6 - 4.6 x10(3)/mcL    Neut # 1.92 (L) 2.1 - 9.2 x10(3)/mcL    Mono # 1.28 0.1 - 1.3 x10(3)/mcL    Eos # 0.01 0 - 0.9 x10(3)/mcL    Baso # 0.02 <=0.2 x10(3)/mcL    IG# 0.02 0 - 0.04 x10(3)/mcL    IG% 0.5 %    NRBC% 0.0 %    IPF 6.4 0.9 - 11.2 %   Urinalysis, Reflex to Urine Culture    Collection Time: 04/01/24  8:42 AM    Specimen: Urine   Result Value Ref Range    Color, UA Colorless Yellow, Light-Yellow, Colorless, Straw, Dark-Yellow    Appearance, UA Clear Clear    Specific Gravity, UA 1.011 1.005 - 1.030    pH, UA 7.0 5.0 - 8.5    Protein, UA Negative Negative    Glucose, UA Normal Negative, Normal    Ketones, UA Negative Negative     Blood, UA Negative Negative    Bilirubin, UA Negative Negative    Urobilinogen, UA Normal 0.2, 1.0, Normal    Nitrites, UA Negative Negative    Leukocyte Esterase, UA Negative Negative    WBC, UA 0-5 None Seen, 0-2, 3-5, 0-5 /HPF    Bacteria, UA None Seen None Seen, Trace /HPF    Squamous Epithelial Cells, UA None Seen None Seen /HPF    RBC, UA 0-5 None Seen, 0-2, 3-5, 0-5 /HPF     Significant Imaging:  Imaging Results              X-Ray Foot Complete Left (Final result)  Result time 04/01/24 12:14:03      Final result by León De La O MD (04/01/24 12:14:03)                   Impression:      Fracture of the 5th metatarsal      Electronically signed by: León De La O MD  Date:    04/01/2024  Time:    12:14               Narrative:    EXAMINATION:  XR FOOT COMPLETE 3 VIEW LEFT    CLINICAL HISTORY:  s/p fall;.    TECHNIQUE:  AP, lateral and oblique views of the left foot were performed.    COMPARISON:  None    FINDINGS:  There is a fracture of the distal end of the 5th metatarsal.  Fracture is angulated and  displaced.  No other fractures are seen.                                       X-Ray Knee Complete 4 Or More Views Right (Final result)  Result time 04/01/24 08:19:23      Final result by Lan Magaña MD (04/01/24 08:19:23)                   Impression:      No acute osseous abnormality identified.      Electronically signed by: Lan Magaña  Date:    04/01/2024  Time:    08:19               Narrative:    EXAMINATION:  XR KNEE COMP 4 OR MORE VIEWS RIGHT    CLINICAL HISTORY:  pain; fall    TECHNIQUE:  Four views    COMPARISON:  None available.    FINDINGS:  The osseous and articular surfaces are unremarkable.  There is no acute fracture, dislocation or arthritic change.                                       X-Ray Knee Complete 4 or More Views Left (Final result)  Result time 04/01/24 08:20:30      Final result by Lan Magaña MD (04/01/24 08:20:30)                   Impression:      No acute osseous  abnormality identified.      Electronically signed by: Lan Magaña  Date:    04/01/2024  Time:    08:20               Narrative:    EXAMINATION:  XR KNEE COMP 4 OR MORE VIEWS LEFT    CLINICAL HISTORY:  pain;    TECHNIQUE:  Four views    COMPARISON:  None available.    FINDINGS:  There is moderate degenerative narrowing of the medial compartment of the left knee.  Articular surfaces alignment is unremarkable.  No acute fracture or dislocation identified.                                       X-Ray Chest AP Portable (Final result)  Result time 04/01/24 07:21:44      Final result by Niall Mcneill MD (04/01/24 07:21:44)                   Impression:      No acute cardiopulmonary process.      Electronically signed by: Niall Mcneill  Date:    04/01/2024  Time:    07:21               Narrative:    EXAMINATION:  XR CHEST AP PORTABLE    CLINICAL HISTORY:  Dizziness and giddiness    TECHNIQUE:  Single view of the chest    COMPARISON:  No prior imaging available for comparison.    FINDINGS:  Prominent interstitial markings with no focal opacification.    The cardiomediastinal silhouette remains prominent.    No acute osseous abnormality.                                       CT Cervical Spine Without Contrast (Final result)  Result time 04/01/24 06:57:25      Final result by Lan Magaña MD (04/01/24 06:57:25)                   Impression:      No acute fracture or malalignment identified.      Electronically signed by: Lan Magaña  Date:    04/01/2024  Time:    06:57               Narrative:    EXAMINATION:  CT CERVICAL SPINE WITHOUT CONTRAST    CLINICAL HISTORY:  Trauma.    TECHNIQUE:  Multidetector axial images were performed of the cervical spine without and.  Images were reconstructed.    Automated exposure control was utilized to minimize radiation dose.  .    COMPARISON:  None available.    FINDINGS:  Cervical vertebrae stature is maintained.  There is no significant listhesis..  No acute fracture or  malalignment identified.  There are multilevel degenerative changes which are most apparent at the level of C5-C6.  At C5-C6, there is ventral impression upon the thecal sac by ventral osteophyte ridging and moderate narrowing of the right neural foramen and marked narrowing of the left neural foramen..  There is no prevertebral soft tissue prominence.    This study does not exclude the possibility of intrathecal soft tissue, ligamentous or vascular injury.                                       CT Head Without Contrast (Final result)  Result time 04/01/24 06:54:07      Final result by Lan Magaña MD (04/01/24 06:54:07)                   Impression:      No acute intracranial traumatic findings identified.      Electronically signed by: Lan Magaña  Date:    04/01/2024  Time:    06:54               Narrative:    EXAMINATION:  CT HEAD WITHOUT CONTRAST    CLINICAL HISTORY:  Dizziness, persistent/recurrent, cardiac or vascular cause suspected;    TECHNIQUE:  Sequential axial images were performed of the brain without contrast.    Dose product length of 906 mGycm. Automated exposure control was utilized to minimize radiation dose.    COMPARISON:  None available.    FINDINGS:  There is no intracranial mass effect, midline shift, hydrocephalus or hemorrhage. There is no sulcal effacement or low attenuation changes to suggest recent large vessel territory infarction. Chronic appearing periventricular and subcortical white matter low attenuation changes are present and are consistent with marked chronic microangiopathic ischemia. The ventricular system and sulcal markings prominence is consistent with atrophy. There is no acute extra axial fluid collection.  There is no acute depressed calvarial fracture.  Visualized paranasal sinuses are clear without mucosal thickening, polypoidal abnormality or air-fluid levels. Mastoid air cells aeration is optimal.                                    EKG:       Telemetry:   SR    Physical Exam  HENT:      Head: Normocephalic.      Nose: Nose normal.      Mouth/Throat:      Mouth: Mucous membranes are moist.   Eyes:      Extraocular Movements: Extraocular movements intact.   Cardiovascular:      Rate and Rhythm: Normal rate and regular rhythm.      Pulses: Normal pulses.      Heart sounds: Normal heart sounds.   Pulmonary:      Effort: Pulmonary effort is normal.      Breath sounds: Normal breath sounds.   Abdominal:      Palpations: Abdomen is soft.   Musculoskeletal:      Comments: Left foot w/ edema & ecchymosis  Left knee edema   Neurological:      Mental Status: She is alert and oriented to person, place, and time.   Psychiatric:         Behavior: Behavior normal.         Home Medications:   No current facility-administered medications on file prior to encounter.     Current Outpatient Medications on File Prior to Encounter   Medication Sig Dispense Refill    atorvastatin (LIPITOR) 10 MG tablet Take 10 mg by mouth every evening. Dosage unsure.      diltiaZEM (TIAZAC) 360 MG Cs24 Take 360 mg by mouth once daily.      furosemide (LASIX) 10 mg/mL (alcohol free) solution Take by mouth. Dosage unsure      meloxicam (MOBIC) 7.5 MG tablet Take 7.5 mg by mouth once daily.      pantoprazole (PROTONIX) 20 MG tablet Take 20 mg by mouth 2 (two) times daily before meals. Dosage unsure.      sertraline (ZOLOFT) 100 MG tablet Take 100 mg by mouth every evening. Dosage unsure.      sulfamethoxazole-trimethoprim 800-160mg (BACTRIM DS) 800-160 mg Tab Take 1 tablet by mouth 2 (two) times daily.      traMADoL (ULTRAM-ER) 100 MG Tb24 Take 50 mg by mouth once daily.      meclizine (ANTIVERT) 50 MG tablet Take 5 mg by mouth every 12 (twelve) hours as needed.       Current Inpatient Medications:    Current Facility-Administered Medications:     acetaminophen tablet 650 mg, 650 mg, Oral, Q8H PRN, Drea Rivas, AGACNP-BC    acetaminophen tablet 650 mg, 650 mg, Oral, Q4H PRN, Drea Rivas,  [facility]    atorvastatin tablet 10 mg, 10 mg, Oral, Daily, Dhiraj Cavanaugh MD    diltiaZEM 24 hr capsule 360 mg, 360 mg, Oral, Daily, Dhiraj Cavanaugh MD, 360 mg at 04/01/24 1158    docusate sodium capsule 100 mg, 100 mg, Oral, BID PRN, Dhiraj Cavanaugh MD    hydrALAZINE injection 10 mg, 10 mg, Intravenous, Q4H PRN, Dhiraj Cavanaugh MD    labetaloL injection 10 mg, 10 mg, Intravenous, Q4H PRN, Dhiraj Cavanaugh MD    meclizine tablet 12.5 mg, 12.5 mg, Oral, TID PRN, Dhiraj Cavanaugh MD    ondansetron injection 4 mg, 4 mg, Intravenous, Q8H PRN, Drea Rivas, [facility]    pantoprazole EC tablet 20 mg, 20 mg, Oral, BID AC, Dhiraj Cavanaugh MD    polyethylene glycol packet 17 g, 17 g, Oral, BID PRN, Dhiraj Cavanaugh MD    sertraline tablet 100 mg, 100 mg, Oral, Daily, Dhiraj Cavanaugh MD    sulfamethoxazole-trimethoprim 800-160mg per tablet 1 tablet, 1 tablet, Oral, BID, Dhiraj Cavanaugh MD, 1 tablet at 04/01/24 1158    traMADoL tablet 50 mg, 50 mg, Oral, Q6H PRN, Dhiraj Cavanaugh MD    Current Outpatient Medications:     atorvastatin (LIPITOR) 10 MG tablet, Take 10 mg by mouth every evening. Dosage unsure., Disp: , Rfl:     diltiaZEM (TIAZAC) 360 MG Cs24, Take 360 mg by mouth once daily., Disp: , Rfl:     furosemide (LASIX) 10 mg/mL (alcohol free) solution, Take by mouth. Dosage unsure, Disp: , Rfl:     meloxicam (MOBIC) 7.5 MG tablet, Take 7.5 mg by mouth once daily., Disp: , Rfl:     pantoprazole (PROTONIX) 20 MG tablet, Take 20 mg by mouth 2 (two) times daily before meals. Dosage unsure., Disp: , Rfl:     sertraline (ZOLOFT) 100 MG tablet, Take 100 mg by mouth every evening. Dosage unsure., Disp: , Rfl:     sulfamethoxazole-trimethoprim 800-160mg (BACTRIM DS) 800-160 mg Tab, Take 1 tablet by mouth 2 (two) times daily., Disp: , Rfl:     traMADoL (ULTRAM-ER) 100 MG Tb24, Take 50 mg by mouth once daily., Disp: , Rfl:     meclizine (ANTIVERT) 50 MG tablet, Take 5 mg by mouth every 12 (twelve) hours as needed., Disp: , Rfl:   VTE Risk  Mitigation (From admission, onward)           Ordered     IP VTE HIGH RISK PATIENT  Once         04/01/24 0949     Place sequential compression device  Until discontinued         04/01/24 0949                  Assessment:   NSTEMI - Likely Type 2 in the Setting of Fall & HTN Urgency  HTN Urgency    - Hx of HTN   Fall (Multiple over Last 3 Months)    - Likely from Vertigo - Not taking prescribed Meclizine  Left Fracture of the 5th Metatarsal   Constipation   Vertigo  HLD  GERD  No Hx of GIB    Plan:   Trend troponin until peak.  Obtain echo.   Monitor on tele.   Resume home meds.    Patient seen and examined.    Hypertensive urgency.    Status post fall.    Type 2 MI most likely in setting of hypertension.    History of of hyperlipidemia.    Trend troponins.    Observe for now.    Continue home medications.    We will follow.    Thank you for your consult.     Sayra Coto, JOHNATHAN  Cardiology  [facility] - Emergency Dept  04/01/2024

## 2024-04-01 NOTE — CONSULTS
Inpatient consult to Cardiology  Consult performed by: Sayra Coto FNP  Consult ordered by: Phillip Villagran MD  Reason for consult: elevated troponin      Please see same day progress note as consult note (4.1.24).

## 2024-04-01 NOTE — H&P
[facility] Hospital Medicine History & Physical Examination       Patient Name: Yoli Villarreal  MRN: [id number]  Patient Class: Emergency   Admission Date: 04/01/2024   Admitting Service: Hospital Medicine   Length of Stay: 0  Attending Physician: Dr. Cavanaugh   Primary Care Provider: Nacho Kulkarni MD  Face-to-Face encounter date: 04/01/2024  Code Status: Not addressed  Chief Complaint: Knee Pain (Per pts daughter she has been falling a lot recently. Pt has hx of vertigo. Pt fell today and hurt her knees. Denies hitting her head when she falls. No blood thinners. No LOC. GCS 14 at baseline. )      Present at Bedside: Daughter. ED RN.  Patient information was obtained from patient, patient's family, past medical records and ER records.      HISTORY OF PRESENT ILLNESS:   Yoli Villarreal is a 89 y.o. female with a PMHx of HTN, HLD, GERD, vertigo, macular degeneration who presented to [facility] via EMS on 4/1/2024 with c/o left knee pain following a fall that occurred prior to arrival.  Denied head injury or LOC.  Patient's daughter at the bedside provided some of the history; reports that patient has fallen 4x over the past 3 months. Patient currently on Meclizine for vertigo; however it does not help with dizziness. She has PT at assisted living currently. Patient also dx with UTI x3 days ago and is taking Bactrim PO.     Vital Signs upon presentation to the ED included /56, HR 94, RR 24, SpO2 97% on 3 L nasal cannula, temperature 96.8° F.  Labs notable for WBC 4.14, hemoglobin 11.4, hematocrit 36, platelets 110, CO2 22, troponin 0.062.  Left knee x-ray negative for acute osseous abnormality.  Right knee x-ray negative for acute osseous abnormality.  CXR negative for acute cardiopulmonary process.  CT C-spine without contrast negative for acute fracture or malalignment.  CT head without contrast negative for acute intracranial traumatic findings.  EKG demonstrated normal sinus  rhythm.  Cardiology service consulted in ED. Family notably upset regarding the delay in visitation due to movement restrictions amongst other complaints regarding lack of care provided in ED.    She was admitted to hospital medicine service for further medical management.    REVIEW OF SYSTEMS:   Except as documented, all other systems reviewed and negative.    PAST MEDICAL HISTORY:   Essential Hypertension   Hyperlipidemia   GERD  Vertigo   Macular degeneration    PAST SURGICAL HISTORY:     Past Surgical History:   Procedure Laterality Date    ADENOIDECTOMY      APPENDECTOMY       SECTION      HYSTERECTOMY      NEPHRECTOMY      TONSILLECTOMY         FAMILY HISTORY:   Reviewed and negative    SOCIAL HISTORY:   Denied alcohol, tobacco or illicit drug use.     ALLERGIES:   Iodine    HOME MEDICATIONS:     Prior to Admission medications    Medication Sig Start Date End Date Taking? Authorizing Provider   atorvastatin (LIPITOR) 10 MG tablet Take 10 mg by mouth once daily. Dosage unsure.    Provider, Historical   diltiaZEM (TIAZAC) 360 MG Cs24 Take 360 mg by mouth once daily.    Provider, Historical   furosemide (LASIX) 10 mg/mL (alcohol free) solution Take by mouth. Dosage unsure    Provider, Historical   pantoprazole (PROTONIX) 20 MG tablet Take 20 mg by mouth once daily. Dosage unsure.    Provider, Historical   sertraline (ZOLOFT) 100 MG tablet Take 100 mg by mouth once daily. Dosage unsure.    Provider, Historical     ________________________________________________________________________  INPATIENT LIST OF MEDICATIONS   No current facility-administered medications for this encounter.    Current Outpatient Medications:     atorvastatin (LIPITOR) 10 MG tablet, Take 10 mg by mouth once daily. Dosage unsure., Disp: , Rfl:     diltiaZEM (TIAZAC) 360 MG Cs24, Take 360 mg by mouth once daily., Disp: , Rfl:     furosemide (LASIX) 10 mg/mL (alcohol free) solution, Take by mouth. Dosage unsure, Disp: , Rfl:      pantoprazole (PROTONIX) 20 MG tablet, Take 20 mg by mouth once daily. Dosage unsure., Disp: , Rfl:     sertraline (ZOLOFT) 100 MG tablet, Take 100 mg by mouth once daily. Dosage unsure., Disp: , Rfl:     Scheduled Meds:  Continuous Infusions:  PRN Meds:.    PHYSICAL EXAM:     VITAL SIGNS: 24 HRS MIN & MAX LAST   Temp  Min: 96.8 °F (36 °C)  Max: 98.8 °F (37.1 °C) 98.8 °F (37.1 °C)   BP  Min: 142/56  Max: 179/83 (!) 179/83   Pulse  Min: 73  Max: 94  85   Resp  Min: 14  Max: 25 (!) 25   SpO2  Min: 93 %  Max: 97 % (!) 93 %       General appearance: Elderly,  female in no apparent distress.  HENT: Atraumatic head. Moist mucous membranes of oral cavity. Hard of Hearing.  Eyes: Normal extraocular movements.   Lungs: No respiratory distress. On O2 via Nasal Cannula  Heart: Regular rate and rhythm. S1 and S2 present. No pedal edema.  Abdomen: Soft, non-distended, non-tender.  Extremities: No cyanosis, clubbing, or edema.   Skin: No Rash. Left foot with edema and ecchymosis. Left Knee edma.  Neuro: Motor and sensory exams grossly intact.   Psych/mental status: Awake and alert. Appropriate mood and affect. Responds appropriately to questions.     LABS AND IMAGING:     Recent Labs   Lab 04/01/24  0709   WBC 4.14*   RBC 4.03*   HGB 11.4*   HCT 36.0*   MCV 89.3   MCH 28.3   MCHC 31.7*   RDW 15.9   *   MPV 12.1*       Recent Labs   Lab 04/01/24  0709      K 4.3   CO2 22*   BUN 19.7   CREATININE 0.91   CALCIUM 9.4   ALBUMIN 3.7   ALKPHOS 71   ALT 11   AST 17   BILITOT 0.4       Microbiology Results (last 7 days)       ** No results found for the last 168 hours. **             X-Ray Knee Complete 4 or More Views Left  Narrative: EXAMINATION:  XR KNEE COMP 4 OR MORE VIEWS LEFT    CLINICAL HISTORY:  pain;    TECHNIQUE:  Four views    COMPARISON:  None available.    FINDINGS:  There is moderate degenerative narrowing of the medial compartment of the left knee.  Articular surfaces alignment is unremarkable.  No  acute fracture or dislocation identified.  Impression: No acute osseous abnormality identified.    Electronically signed by: Lan Magaña  Date:    04/01/2024  Time:    08:20  X-Ray Knee Complete 4 Or More Views Right  Narrative: EXAMINATION:  XR KNEE COMP 4 OR MORE VIEWS RIGHT    CLINICAL HISTORY:  pain; fall    TECHNIQUE:  Four views    COMPARISON:  None available.    FINDINGS:  The osseous and articular surfaces are unremarkable.  There is no acute fracture, dislocation or arthritic change.  Impression: No acute osseous abnormality identified.    Electronically signed by: Lan Magaña  Date:    04/01/2024  Time:    08:19  X-Ray Chest AP Portable  Narrative: EXAMINATION:  XR CHEST AP PORTABLE    CLINICAL HISTORY:  Dizziness and giddiness    TECHNIQUE:  Single view of the chest    COMPARISON:  No prior imaging available for comparison.    FINDINGS:  Prominent interstitial markings with no focal opacification.    The cardiomediastinal silhouette remains prominent.    No acute osseous abnormality.  Impression: No acute cardiopulmonary process.    Electronically signed by: Niall Mcneill  Date:    04/01/2024  Time:    07:21  CT Cervical Spine Without Contrast  Narrative: EXAMINATION:  CT CERVICAL SPINE WITHOUT CONTRAST    CLINICAL HISTORY:  Trauma.    TECHNIQUE:  Multidetector axial images were performed of the cervical spine without and.  Images were reconstructed.    Automated exposure control was utilized to minimize radiation dose.  .    COMPARISON:  None available.    FINDINGS:  Cervical vertebrae stature is maintained.  There is no significant listhesis..  No acute fracture or malalignment identified.  There are multilevel degenerative changes which are most apparent at the level of C5-C6.  At C5-C6, there is ventral impression upon the thecal sac by ventral osteophyte ridging and moderate narrowing of the right neural foramen and marked narrowing of the left neural foramen..  There is no prevertebral  soft tissue prominence.    This study does not exclude the possibility of intrathecal soft tissue, ligamentous or vascular injury.  Impression: No acute fracture or malalignment identified.    Electronically signed by: Lan Magaña  Date:    04/01/2024  Time:    06:57  CT Head Without Contrast  Narrative: EXAMINATION:  CT HEAD WITHOUT CONTRAST    CLINICAL HISTORY:  Dizziness, persistent/recurrent, cardiac or vascular cause suspected;    TECHNIQUE:  Sequential axial images were performed of the brain without contrast.    Dose product length of 906 mGycm. Automated exposure control was utilized to minimize radiation dose.    COMPARISON:  None available.    FINDINGS:  There is no intracranial mass effect, midline shift, hydrocephalus or hemorrhage. There is no sulcal effacement or low attenuation changes to suggest recent large vessel territory infarction. Chronic appearing periventricular and subcortical white matter low attenuation changes are present and are consistent with marked chronic microangiopathic ischemia. The ventricular system and sulcal markings prominence is consistent with atrophy. There is no acute extra axial fluid collection.  There is no acute depressed calvarial fracture.  Visualized paranasal sinuses are clear without mucosal thickening, polypoidal abnormality or air-fluid levels. Mastoid air cells aeration is optimal.  Impression: No acute intracranial traumatic findings identified.    Electronically signed by: Lan Magaña  Date:    04/01/2024  Time:    06:54        ASSESSMENT & PLAN:   Dizziness   Elevated Troponin  Frequent ground level falls   Essential hypertension  History of Hyperlipidemia, GERD, Vertigo     Plan:  Fall precautions   Cardiology consulted, appreciate recommendations   Cardiac monitoring  Trend troponin x3   Echocardiogram pending   Left Foot X-rays ordered   Resume home medications as deemed appropriate once medication reconciliation is updated  Labs in AM    VTE  Prophylaxis: SCDs    Discharge Planning and Disposition: TBD    I, Drea Rivas, NP have reviewed and discussed the case with Dr. Cavanaugh.   Please see the attending MD's addendum for further assessment and plan.    Drea Rivas, [facility]  Department of Hospital Medicine   [facility]   04/01/2024    This note was created with the assistance of [employer]ognition [employer]. There may be transcription errors as a result of using this technology, however minimal and effort has been made to assure accuracy of transcription, but any obvious errors or omissions should be clarified with the author of the document.    _______________________________________________________________________________  MD Addendum:  For this patient encounter, I reviewed the NP/PA/resident documentation, treatment plan, and medical decision making; and I had face-to-face time with this patient.     History: Reviewed HPI, medical, surgical, family, and social histories as above    Physical exam: Agree with documentation as above    Treatment Plan:  89-year-old female seen in the emergency room on 04/01/2024 after a fall at home.  Reporting ongoing dizziness which appears to be chronic.  On meclizine but without relief.  He was had multiple falls over the last 3 months.  Patient was currently in an assisted living.  He was diagnosed with a UTI several days ago he was currently taking Bactrim.  Reporting left lower extremity pain including her knee and foot.  X-ray of the foot did show a 5th metatarsal fracture.  Will have Orthopedics look at it but doubt that this is surgical.  PT and OT have already been consulted.  More significantly she had an elevation of her troponin of 0.69.  The rest of her blood work appears to be stable.  Cardiology was consulted from the emergency room.  Due to patient's advanced age and comorbidities will see if any further workup he was needed or if we will treat medically.  She  was not appear to be significantly dehydrated.  Will continue home Bactrim which she was prescribed for UTI.  Urinalysis done in the ER shows no bacteria.  She can finish her course here.  Will follow up any further recommendations from specialist but suspect she will be able to go home in the next 24 hours.      All diagnosis and differential diagnosis have been reviewed; assessment and plan has been documented; I have personally reviewed the labs and test results that are presently available; I have reviewed the patients medication list; I have reviewed the consulting providers response and recommendations. I have reviewed or attempted to review medical records based upon their availability.    All of the patient and family questions have been addressed and answered. Patient's is agreeable to the above stated plan. I will continue to monitor closely and make adjustments to medical management as needed.      04/01/2024

## 2024-04-01 NOTE — CONSULTS
[facility] - Emergency Dept  Orthopedic Trauma  Consult Note    Patient Name: Yoli Villarreal  MRN: [id number]  Admission Date: 2024  Hospital Length of Stay: 0 days  Attending Provider: Dhiraj Cavanaugh MD  Primary Care Provider: Nacho Kulkarni MD        Inpatient consult to Orthopedic Surgery  Consult performed by: Santino Jiménez DO  Consult ordered by: Dhiraj Cavanaugh MD        Subjective:         Chief Complaint:   Chief Complaint   Patient presents with    Knee Pain     Per pts daughter she has been falling a lot recently. Pt has hx of vertigo. Pt fell today and hurt her knees. Denies hitting her head when she falls. No blood thinners. No LOC. GCS 14 at baseline.         HPI:  Patient has been falling a lot recently.  Patient fell onto the knees over the left foot.  Patient has pain over her left foot diagnosed with a 5th metatarsal fracture.  Family currently upset with her current care.  Patient about to receive an enema on exam.  No numbness no tingling no history of fracture in this area.    Past Medical History:   Diagnosis Date    Acid reflux     High cholesterol     HTN (hypertension)     Unspecified macular degeneration        Past Surgical History:   Procedure Laterality Date    ADENOIDECTOMY      APPENDECTOMY       SECTION      HYSTERECTOMY      NEPHRECTOMY      TONSILLECTOMY         Review of patient's allergies indicates:   Allergen Reactions    Iodine      IVP reaction       Current Facility-Administered Medications   Medication    acetaminophen tablet 650 mg    acetaminophen tablet 650 mg    atorvastatin tablet 10 mg    diltiaZEM 24 hr capsule 360 mg    docusate sodium capsule 100 mg    hydrALAZINE injection 10 mg    labetaloL injection 10 mg    meclizine tablet 12.5 mg    ondansetron injection 4 mg    pantoprazole EC tablet 20 mg    polyethylene glycol packet 17 g    sertraline tablet 100 mg    sulfamethoxazole-trimethoprim 800-160mg per tablet 1 tablet    traMADoL  "tablet 50 mg     Current Outpatient Medications   Medication Sig    atorvastatin (LIPITOR) 10 MG tablet Take 10 mg by mouth every evening. Dosage unsure.    diltiaZEM (TIAZAC) 360 MG Cs24 Take 360 mg by mouth once daily.    furosemide (LASIX) 10 mg/mL (alcohol free) solution Take by mouth. Dosage unsure    meloxicam (MOBIC) 7.5 MG tablet Take 7.5 mg by mouth once daily.    pantoprazole (PROTONIX) 20 MG tablet Take 20 mg by mouth 2 (two) times daily before meals. Dosage unsure.    sertraline (ZOLOFT) 100 MG tablet Take 100 mg by mouth every evening. Dosage unsure.    sulfamethoxazole-trimethoprim 800-160mg (BACTRIM DS) 800-160 mg Tab Take 1 tablet by mouth 2 (two) times daily.    traMADoL (ULTRAM-ER) 100 MG Tb24 Take 50 mg by mouth once daily.    meclizine (ANTIVERT) 50 MG tablet Take 5 mg by mouth every 12 (twelve) hours as needed.     Family History       Problem Relation (Age of Onset)    No Known Problems Father, Sister, Brother    Ovarian cancer Mother          Tobacco Use    Smoking status: Never    Smokeless tobacco: Never   Substance and Sexual Activity    Alcohol use: Never    Drug use: Never    Sexual activity: Not on file       ROS:  Constitutional: Denies fever chills  Eyes: No change in vision  ENT: No ringing or current infections  CV: No chest pain  Resp: No labored breathing  MSK: Pain evident at site of injury located in HPI,   Integ: No signs of abrasions or lacerations  Neuro: No numbness or tingling  Lymphatic: No swelling outside the area of injury   Objective:     Vital Signs (Most Recent):  Temp: 98.8 °F (37.1 °C) (04/01/24 0625)  Pulse: 87 (04/01/24 1432)  Resp: (!) 25 (04/01/24 1126)  BP: (!) 130/55 (04/01/24 1432)  SpO2: 97 % (04/01/24 1432) Vital Signs (24h Range):  Temp:  [96.8 °F (36 °C)-98.8 °F (37.1 °C)] 98.8 °F (37.1 °C)  Pulse:  [73-94] 87  Resp:  [14-25] 25  SpO2:  [93 %-97 %] 97 %  BP: (130-179)/() 130/55     Weight: 54.4 kg (119 lb 14.9 oz)  Height: 4' 11" (149.9 cm)  Body " mass index is 24.22 kg/m².      Intake/Output Summary (Last 24 hours) at 4/1/2024 1600  Last data filed at 4/1/2024 0755  Gross per 24 hour   Intake 1000 ml   Output --   Net 1000 ml       Ortho/SPM Exam  General the patient is alert and oriented x3 no acute distress nontoxic-appearing appropriate affect.    Constitutional: Vital signs are examined and stable.  Resp: No signs of labored breathing                 LLE: -Skin:  No abrasions no open injury, tenderness palpation over the 5th metatarsal No signs of new abrasions or lacerations, no scars           -MSK: Hip and Knee F/E, EHL/FHL, Gastroc/Tib anterior Strength 5/5           -Neuro:  Sensation intact to light touch L3-S1 dermatomes           -Lymphatic: No signs of lymphadenopathy           -CV: Capillary refill is less than 2 seconds. DP/PT pulses 2/4. Compartments soft and compressible       Significant Labs:  I have reviewed all labs in relation to Orthopedics  Recent Lab Results         04/01/24  1307   04/01/24  0842   04/01/24  0709        Immature Platelet Fraction     6.4       Albumin/Globulin Ratio     1.3       Albumin     3.7       ALP     71       ALT     11       Appearance, UA   Clear         AST     17       Bacteria, UA   None Seen         Baso #     0.02       Basophil %     0.5       BILIRUBIN TOTAL     0.4       Bilirubin, UA   Negative         BUN     19.7       Calcium     9.4       Chloride     109       CO2     22       Color, UA   Colorless         Creatinine     0.91       eGFR     57       Eos #     0.01       Eos %     0.2       Globulin, Total     2.9       Glucose     106       Glucose, UA   Normal         Hematocrit     36.0       Hemoglobin     11.4       Immature Grans (Abs)     0.02       Immature Granulocytes     0.5       INR     1.2       Ketones, UA   Negative         Leukocyte Esterase, UA   Negative         Lymph #     0.89       LYMPH %     21.5       MCH     28.3       MCHC     31.7       MCV     89.3       Mono #      1.28       Mono %     30.9       MPV     12.1       Neut #     1.92       Neut %     46.4       NITRITE UA   Negative         nRBC     0.0       Blood, UA   Negative         pH, UA   7.0         Platelet Count     110       Potassium     4.3       PROTEIN TOTAL     6.6       Protein, UA   Negative         PT     15.1       RBC     4.03       RBC, UA   0-5         RDW     15.9       Sodium     140       Specific Gravity,UA   1.011         Squamous Epithelial Cells, UA   None Seen         Troponin I 0.069     0.062       Urobilinogen, UA   Normal         WBC, UA   0-5         WBC     4.14               Significant Imaging: I have reviewed all pertinent imaging results/findings.  X-Ray Foot Complete Left    Result Date: 4/1/2024  EXAMINATION: XR FOOT COMPLETE 3 VIEW LEFT CLINICAL HISTORY: s/p fall;. TECHNIQUE: AP, lateral and oblique views of the left foot were performed. COMPARISON: None FINDINGS: There is a fracture of the distal end of the 5th metatarsal.  Fracture is angulated and  displaced.  No other fractures are seen.     Fracture of the 5th metatarsal Electronically signed by: León De La O MD Date:    04/01/2024 Time:    12:14    X-Ray Knee Complete 4 or More Views Left    Result Date: 4/1/2024  EXAMINATION: XR KNEE COMP 4 OR MORE VIEWS LEFT CLINICAL HISTORY: pain; TECHNIQUE: Four views COMPARISON: None available. FINDINGS: There is moderate degenerative narrowing of the medial compartment of the left knee.  Articular surfaces alignment is unremarkable.  No acute fracture or dislocation identified.     No acute osseous abnormality identified. Electronically signed by: Lan Magaña Date:    04/01/2024 Time:    08:20    X-Ray Knee Complete 4 Or More Views Right    Result Date: 4/1/2024  EXAMINATION: XR KNEE COMP 4 OR MORE VIEWS RIGHT CLINICAL HISTORY: pain; fall TECHNIQUE: Four views COMPARISON: None available. FINDINGS: The osseous and articular surfaces are unremarkable.  There is no acute fracture,  dislocation or arthritic change.     No acute osseous abnormality identified. Electronically signed by: Lan Magaña Date:    04/01/2024 Time:    08:19    X-Ray Chest AP Portable    Result Date: 4/1/2024  EXAMINATION: XR CHEST AP PORTABLE CLINICAL HISTORY: Dizziness and giddiness TECHNIQUE: Single view of the chest COMPARISON: No prior imaging available for comparison. FINDINGS: Prominent interstitial markings with no focal opacification. The cardiomediastinal silhouette remains prominent. No acute osseous abnormality.     No acute cardiopulmonary process. Electronically signed by: Niall Mcneill Date:    04/01/2024 Time:    07:21    CT Cervical Spine Without Contrast    Result Date: 4/1/2024  EXAMINATION: CT CERVICAL SPINE WITHOUT CONTRAST CLINICAL HISTORY: Trauma. TECHNIQUE: Multidetector axial images were performed of the cervical spine without and.  Images were reconstructed. Automated exposure control was utilized to minimize radiation dose.  . COMPARISON: None available. FINDINGS: Cervical vertebrae stature is maintained.  There is no significant listhesis..  No acute fracture or malalignment identified.  There are multilevel degenerative changes which are most apparent at the level of C5-C6.  At C5-C6, there is ventral impression upon the thecal sac by ventral osteophyte ridging and moderate narrowing of the right neural foramen and marked narrowing of the left neural foramen..  There is no prevertebral soft tissue prominence. This study does not exclude the possibility of intrathecal soft tissue, ligamentous or vascular injury.     No acute fracture or malalignment identified. Electronically signed by: Lan Magaña Date:    04/01/2024 Time:    06:57    CT Head Without Contrast    Result Date: 4/1/2024  EXAMINATION: CT HEAD WITHOUT CONTRAST CLINICAL HISTORY: Dizziness, persistent/recurrent, cardiac or vascular cause suspected; TECHNIQUE: Sequential axial images were performed of the brain without  contrast. Dose product length of 906 mGycm. Automated exposure control was utilized to minimize radiation dose. COMPARISON: None available. FINDINGS: There is no intracranial mass effect, midline shift, hydrocephalus or hemorrhage. There is no sulcal effacement or low attenuation changes to suggest recent large vessel territory infarction. Chronic appearing periventricular and subcortical white matter low attenuation changes are present and are consistent with marked chronic microangiopathic ischemia. The ventricular system and sulcal markings prominence is consistent with atrophy. There is no acute extra axial fluid collection.  There is no acute depressed calvarial fracture.  Visualized paranasal sinuses are clear without mucosal thickening, polypoidal abnormality or air-fluid levels. Mastoid air cells aeration is optimal.     No acute intracranial traumatic findings identified. Electronically signed by: Lan Magaña Date:    04/01/2024 Time:    06:54       Assessment/Plan:     Active Diagnoses:    Diagnosis Date Noted POA    Displaced fracture of fifth metatarsal bone, left foot, initial encounter for closed fracture [S92.352A] 04/01/2024 Yes      Problems Resolved During this Admission:       Independent Radiology ordered by other provider:   Three views left foot skeletally mature individual shows a left 5th metatarsal neck fracture with comminution displacement    Pt has acute injury with risk of severe bodily function with their injury to the foot.        Patient about to get enema due to constipation.  Patient has some mild pain over the area fracture without signs of open injury.  Patient will be weight-bearing as tolerated with a postop fracture shoe.  Patient is at risk to have shortening of the metatarsal but due to her age and current condition I do not believe this will affect her long term. Family bedside    This note/OR report was created with the assistance of  voice recognition software or phone   dictation.  There may be transcription errors as a result of using this technology however minimal. Effort has been made to assure accuracy of transcription but any obvious errors or omissions should be clarified with the author of the document.       Santino Jiménez DO   Orthopedic Trauma Surgery  [facility] - Emergency Dept

## 2024-04-02 VITALS
RESPIRATION RATE: 18 BRPM | OXYGEN SATURATION: 97 % | HEART RATE: 71 BPM | TEMPERATURE: 99 F | DIASTOLIC BLOOD PRESSURE: 63 MMHG | SYSTOLIC BLOOD PRESSURE: 162 MMHG | WEIGHT: 119.94 LBS | HEIGHT: 59 IN | BODY MASS INDEX: 24.18 KG/M2

## 2024-04-02 LAB
ALBUMIN SERPL-MCNC: 3.4 G/DL (ref 3.4–4.8)
ALBUMIN/GLOB SERPL: 1.4 RATIO (ref 1.1–2)
ALP SERPL-CCNC: 69 UNIT/L (ref 40–150)
ALT SERPL-CCNC: 12 UNIT/L (ref 0–55)
AST SERPL-CCNC: 18 UNIT/L (ref 5–34)
AV INDEX (PROSTH): 0.63
AV MEAN GRADIENT: 20 MMHG
AV PEAK GRADIENT: 36 MMHG
AV VALVE AREA BY VELOCITY RATIO: 1.51 CM²
AV VALVE AREA: 1.97 CM²
AV VELOCITY RATIO: 0.48
BASOPHILS # BLD AUTO: 0.02 X10(3)/MCL
BASOPHILS NFR BLD AUTO: 0.5 %
BILIRUB SERPL-MCNC: 0.5 MG/DL
BSA FOR ECHO PROCEDURE: 1.5 M2
BUN SERPL-MCNC: 14.5 MG/DL (ref 9.8–20.1)
CALCIUM SERPL-MCNC: 9.3 MG/DL (ref 8.4–10.2)
CHLORIDE SERPL-SCNC: 108 MMOL/L (ref 98–111)
CO2 SERPL-SCNC: 22 MMOL/L (ref 23–31)
CREAT SERPL-MCNC: 0.89 MG/DL (ref 0.55–1.02)
CV ECHO LV RWT: 0.47 CM
DEPRECATED CALCIDIOL+CALCIFEROL SERPL-MC: 22.2 NG/ML (ref 30–80)
DOP CALC AO PEAK VEL: 3 M/S
DOP CALC AO VTI: 48.5 CM
DOP CALC LVOT AREA: 3.1 CM2
DOP CALC LVOT DIAMETER: 2 CM
DOP CALC LVOT PEAK VEL: 1.44 M/S
DOP CALC LVOT STROKE VOLUME: 95.77 CM3
DOP CALC MV VTI: 33 CM
DOP CALCLVOT PEAK VEL VTI: 30.5 CM
E WAVE DECELERATION TIME: 288 MSEC
E/A RATIO: 0.55
E/E' RATIO: 11.69 M/S
ECHO LV POSTERIOR WALL: 1 CM (ref 0.6–1.1)
EOSINOPHIL # BLD AUTO: 0.02 X10(3)/MCL (ref 0–0.9)
EOSINOPHIL NFR BLD AUTO: 0.5 %
ERYTHROCYTE [DISTWIDTH] IN BLOOD BY AUTOMATED COUNT: 16.2 % (ref 11.5–17)
FOLATE SERPL-MCNC: 11.5 NG/ML (ref 7–31.4)
FRACTIONAL SHORTENING: 40 % (ref 28–44)
GFR SERPLBLD CREATININE-BSD FMLA CKD-EPI: 59 MLS/MIN/1.73/M2
GLOBULIN SER-MCNC: 2.4 GM/DL (ref 2.4–3.5)
GLUCOSE SERPL-MCNC: 87 MG/DL (ref 75–121)
HCT VFR BLD AUTO: 35.6 % (ref 37–47)
HGB BLD-MCNC: 11.4 G/DL (ref 12–16)
IMM GRANULOCYTES # BLD AUTO: 0.01 X10(3)/MCL (ref 0–0.04)
IMM GRANULOCYTES NFR BLD AUTO: 0.3 %
INTERVENTRICULAR SEPTUM: 1.3 CM (ref 0.6–1.1)
LEFT ATRIUM SIZE: 4.1 CM
LEFT ATRIUM VOLUME INDEX MOD: 34.1 ML/M2
LEFT ATRIUM VOLUME MOD: 50.4 CM3
LEFT INTERNAL DIMENSION IN SYSTOLE: 2.6 CM (ref 2.1–4)
LEFT VENTRICLE DIASTOLIC VOLUME INDEX: 56.15 ML/M2
LEFT VENTRICLE DIASTOLIC VOLUME: 83.1 ML
LEFT VENTRICLE MASS INDEX: 117 G/M2
LEFT VENTRICLE SYSTOLIC VOLUME INDEX: 16.6 ML/M2
LEFT VENTRICLE SYSTOLIC VOLUME: 24.6 ML
LEFT VENTRICULAR INTERNAL DIMENSION IN DIASTOLE: 4.3 CM (ref 3.5–6)
LEFT VENTRICULAR MASS: 173.65 G
LV LATERAL E/E' RATIO: 8.44 M/S
LV SEPTAL E/E' RATIO: 19 M/S
LVOT MG: 5 MMHG
LVOT MV: 1.01 CM/S
LYMPHOCYTES # BLD AUTO: 1.12 X10(3)/MCL (ref 0.6–4.6)
LYMPHOCYTES NFR BLD AUTO: 28 %
MCH RBC QN AUTO: 28.4 PG (ref 27–31)
MCHC RBC AUTO-ENTMCNC: 32 G/DL (ref 33–36)
MCV RBC AUTO: 88.8 FL (ref 80–94)
MONOCYTES # BLD AUTO: 1.76 X10(3)/MCL (ref 0.1–1.3)
MONOCYTES NFR BLD AUTO: 44 %
MV MEAN GRADIENT: 4 MMHG
MV PEAK A VEL: 1.37 M/S
MV PEAK E VEL: 0.76 M/S
MV PEAK GRADIENT: 9 MMHG
MV STENOSIS PRESSURE HALF TIME: 61 MS
MV VALVE AREA BY CONTINUITY EQUATION: 2.9 CM2
MV VALVE AREA P 1/2 METHOD: 3.61 CM2
NEUTROPHILS # BLD AUTO: 1.07 X10(3)/MCL (ref 2.1–9.2)
NEUTROPHILS NFR BLD AUTO: 26.7 %
NRBC BLD AUTO-RTO: 0 %
OHS LV EJECTION FRACTION SIMPSONS BIPLANE MOD: 59 %
OHS QRS DURATION: 116 MS
OHS QTC CALCULATION: 477 MS
PISA TR MAX VEL: 1.86 M/S
PLATELET # BLD AUTO: 106 X10(3)/MCL (ref 130–400)
PMV BLD AUTO: 12.7 FL (ref 7.4–10.4)
POTASSIUM SERPL-SCNC: 4.1 MMOL/L (ref 3.5–5.1)
PROT SERPL-MCNC: 5.8 GM/DL (ref 5.8–7.6)
PV PEAK GRADIENT: 9 MMHG
PV PEAK VELOCITY: 1.48 M/S
RA PRESSURE ESTIMATED: 3 MMHG
RBC # BLD AUTO: 4.01 X10(6)/MCL (ref 4.2–5.4)
RV TB RVSP: 5 MMHG
SODIUM SERPL-SCNC: 139 MMOL/L (ref 132–146)
TDI LATERAL: 0.09 M/S
TDI SEPTAL: 0.04 M/S
TDI: 0.07 M/S
TR MAX PG: 14 MMHG
TRICUSPID ANNULAR PLANE SYSTOLIC EXCURSION: 1.66 CM
TSH SERPL-ACNC: 1.12 UIU/ML (ref 0.35–4.94)
TV REST PULMONARY ARTERY PRESSURE: 17 MMHG
VIT B12 SERPL-MCNC: 576 PG/ML (ref 213–816)
WBC # SPEC AUTO: 4 X10(3)/MCL (ref 4.5–11.5)
Z-SCORE OF LEFT VENTRICULAR DIMENSION IN END DIASTOLE: -0.26
Z-SCORE OF LEFT VENTRICULAR DIMENSION IN END SYSTOLE: -0.41

## 2024-04-02 PROCEDURE — 84443 ASSAY THYROID STIM HORMONE: CPT | Performed by: STUDENT IN AN ORGANIZED HEALTH CARE EDUCATION/TRAINING PROGRAM

## 2024-04-02 PROCEDURE — 25000003 PHARM REV CODE 250: Performed by: HOSPITALIST

## 2024-04-02 PROCEDURE — 80053 COMPREHEN METABOLIC PANEL: CPT | Performed by: NURSE PRACTITIONER

## 2024-04-02 PROCEDURE — 85025 COMPLETE CBC W/AUTO DIFF WBC: CPT | Performed by: NURSE PRACTITIONER

## 2024-04-02 PROCEDURE — 82306 VITAMIN D 25 HYDROXY: CPT | Performed by: STUDENT IN AN ORGANIZED HEALTH CARE EDUCATION/TRAINING PROGRAM

## 2024-04-02 PROCEDURE — 82746 ASSAY OF FOLIC ACID SERUM: CPT | Performed by: STUDENT IN AN ORGANIZED HEALTH CARE EDUCATION/TRAINING PROGRAM

## 2024-04-02 PROCEDURE — 82607 VITAMIN B-12: CPT | Performed by: STUDENT IN AN ORGANIZED HEALTH CARE EDUCATION/TRAINING PROGRAM

## 2024-04-02 RX ORDER — CHOLECALCIFEROL (VITAMIN D3) 50 MCG
2000 TABLET ORAL DAILY
Qty: 90 TABLET | Refills: 0 | Status: SHIPPED | OUTPATIENT
Start: 2024-04-02 | End: 2024-07-01

## 2024-04-02 RX ADMIN — DILTIAZEM HYDROCHLORIDE 360 MG: 180 CAPSULE, COATED, EXTENDED RELEASE ORAL at 08:04

## 2024-04-02 RX ADMIN — PANTOPRAZOLE SODIUM 20 MG: 20 TABLET, DELAYED RELEASE ORAL at 08:04

## 2024-04-02 RX ADMIN — SULFAMETHOXAZOLE AND TRIMETHOPRIM 1 TABLET: 800; 160 TABLET ORAL at 08:04

## 2024-04-02 NOTE — NURSING
Nurses Note -- 4 Eyes      4/2/2024   6:36 AM      Skin assessed during: Admit      [] No Altered Skin Integrity Present    []Prevention Measures Documented      [x] Yes- Altered Skin Integrity Present or Discovered   [] LDA Added if Not in Epic (Describe Wound)   [x] New Altered Skin Integrity was Present on Admit and Documented in LDA   [] Wound Image Taken    Wound Care Consulted? No    Attending Nurse:  Junie Tripathi RN/Staff Member:   Fatemeh Schmidt RN

## 2024-04-02 NOTE — DISCHARGE SUMMARY
[facility] Hospital Medicine Discharge Summary    Admit Date: 4/1/2024  Discharge Date and Time: 4/2/202410:07 AM  Admitting Physician:  Team  Discharging Physician: Gertrude Pappas DO.  Primary Care Physician: Nacho Kulkarni MD  Consults: Cardiology and Orthopedic Surgery    Discharge Diagnoses:  Dizziness   Elevated Troponin, likely demand  Frequent ground level falls   Essential hypertension  History of Hyperlipidemia, GERD, Vertigo        Hospital Course:   Yoli Villarreal is a 89 y.o. female with a PMHx of HTN, HLD, GERD, vertigo, macular degeneration who presented to [facility] via EMS on 4/1/2024 with c/o left knee pain following a fall that occurred prior to arrival.  Denied head injury or LOC.  Patient's daughter at the bedside provided some of the history; reports that patient has fallen 4x over the past 3 months. Patient currently on Meclizine for vertigo; however it does not help with dizziness. She has PT at assisted living currently. Patient also dx with UTI x3 days ago and is taking Bactrim PO.      Vital Signs upon presentation to the ED included /56, HR 94, RR 24, SpO2 97% on 3 L nasal cannula, temperature 96.8° F.  Labs notable for WBC 4.14, hemoglobin 11.4, hematocrit 36, platelets 110, CO2 22, troponin 0.062.  Left knee x-ray negative for acute osseous abnormality.  Right knee x-ray negative for acute osseous abnormality.  CXR negative for acute cardiopulmonary process.  CT C-spine without contrast negative for acute fracture or malalignment.  CT head without contrast negative for acute intracranial traumatic findings.  EKG demonstrated normal sinus rhythm.  Cardiology service consulted in ED. Family notably upset regarding the delay in visitation due to movement restrictions amongst other complaints regarding lack of care provided in ED.     She was admitted to hospital medicine service for further medical management.  Left foot x-ray showed a 5th metatarsal  fracture.  Patient has a health pain over the area fracture without signs of open injury.  Patient will be weight-bearing as tolerated with a postop fracture shoe.  Patient is at risk of shortness of the metatarsal but due to her age and current condition orthopedic surgery does not believe this will affect her long-term.  Patient received her fracture shoe.  Cardiology was consulted for elevated troponin.  Patient denied any chest pain.  Echo was obtained which showed EF of 55-60% along with moderate aortic stenosis.  These findings were discussed with the patient and daughter who is her power-of-.  Troponins trended down.    Spoke with daughter inpatient on day of discharge.  Daughter is also power-of-.  States that patient currently lives in assisted living and has home health with PT OT and nursing.  Patient is currently nervous due to being out of her normal environment per daughter.  Daughter and patient both are ready to go home and follow up with family doctor.  Patient notably has pancytopenia in her labs, patient's daughter says she regularly gets her blood work checked with her family doctor and is aware of the results.  Folate vitamin B12 were checked were normal.  Patient's TSH was also normal.  Vitamin-D was notably slightly lower.  We will start patient on vitamin-D supplements at discharge.  Labs and vitals otherwise unremarkable.  Patient will finish off her Bactrim for recent UTI at home.  No further questions or concerns at this time from patient or daughter.  She will follow up closely with her family doctor.    Pt was seen and examined on the day of discharge    Vitals:  VITAL SIGNS: 24 HRS MIN & MAX LAST   Temp  Min: 98.1 °F (36.7 °C)  Max: 98.6 °F (37 °C) 98.2 °F (36.8 °C)   BP  Min: 130/64  Max: 177/106 (!) 157/52   Pulse  Min: 63  Max: 93  78   Resp  Min: 18  Max: 25 18   SpO2  Min: 92 %  Max: 97 % 97 %       Physical Exam:  General appearance: Elderly,  female in no  apparent distress.  HENT: Atraumatic head. Moist mucous membranes of oral cavity. Hard of Hearing.  Eyes: Normal extraocular movements.   Lungs: No respiratory distress. On O2 via Nasal Cannula  Heart: Regular rate and rhythm. S1 and S2 present. No pedal edema.  Abdomen: Soft, non-distended, non-tender.  Extremities: No cyanosis, clubbing, or edema.   Skin: No Rash.  Fracture shoe on the left foot  Neuro: Motor and sensory exams grossly intact.   Psych/mental status: Awake and alert. Appropriate mood and affect. Responds appropriately to questions.    Procedures Performed: No admission procedures for hospital encounter.     Significant Diagnostic Studies: See Full reports for all details    Recent Labs   Lab 04/01/24  0709 04/02/24  0400   WBC 4.14* 4.00*   RBC 4.03* 4.01*   HGB 11.4* 11.4*   HCT 36.0* 35.6*   MCV 89.3 88.8   MCH 28.3 28.4   MCHC 31.7* 32.0*   RDW 15.9 16.2   * 106*   MPV 12.1* 12.7*       Recent Labs   Lab 04/01/24  0709 04/02/24  0400    139   K 4.3 4.1   CO2 22* 22*   BUN 19.7 14.5   CREATININE 0.91 0.89   CALCIUM 9.4 9.3   ALBUMIN 3.7 3.4   ALKPHOS 71 69   ALT 11 12   AST 17 18   BILITOT 0.4 0.5        Microbiology Results (last 7 days)       ** No results found for the last 168 hours. **             Echo    Left Ventricle: The left ventricle is normal in size. Mildly increased   wall thickness. There is normal systolic function with a visually   estimated ejection fraction of 55 - 60%. Grade I diastolic dysfunction.    Right Ventricle: Normal right ventricular cavity size. Systolic   function is normal.    Left Atrium: Left atrium is moderately dilated.    Aortic Valve: The aortic valve is a trileaflet valve. There is aortic   valve sclerosis. There is moderate stenosis. Aortic valve area by VTI is   1.97 cm². Aortic valve peak velocity is 3.00 m/s. Mean gradient is 20   mmHg. The dimensionless index is 0.63.    Mitral Valve: There is no stenosis. The mean pressure gradient across    the mitral valve is 4 mmHg at a heart rate of  bpm. There is mild   regurgitation.    IVC/SVC: Normal venous pressure at 3 mmHg.         Medication List        START taking these medications      cholecalciferol (vitamin D3) 50 mcg (2,000 unit) Tab  Commonly known as: VITAMIN D3  Take 1 tablet (2,000 Units total) by mouth once daily.            CONTINUE taking these medications      atorvastatin 10 MG tablet  Commonly known as: LIPITOR     diltiaZEM 360 MG Cs24  Commonly known as: TIAZAC     furosemide 10 mg/mL (alcohol free) solution  Commonly known as: LASIX     meclizine 50 MG tablet  Commonly known as: ANTIVERT     meloxicam 7.5 MG tablet  Commonly known as: MOBIC     pantoprazole 20 MG tablet  Commonly known as: PROTONIX     sertraline 100 MG tablet  Commonly known as: ZOLOFT     sulfamethoxazole-trimethoprim 800-160mg 800-160 mg Tab  Commonly known as: BACTRIM DS     traMADoL 100 MG Tb24  Commonly known as: ULTRAM-ER               Where to Get Your Medications        These medications were sent to [facility]/pharmacy #[id number] - [place], [place] - [address]  [address]  [facility] [address]      Phone: [phone]   cholecalciferol (vitamin D3) 50 mcg (2,000 unit) Tab          Explained in detail to the patient about the discharge plan, medications, and follow-up visits. Pt understands and agrees with the treatment plan  Discharge Disposition:     Discharged Condition: stable  Diet-   Dietary Orders (From admission, onward)       Start     Ordered    04/01/24 1238  Dietary nutrition supplements Boost Plus Nutritional Drink - Any flavor; All Meals  Continuous        Question Answer Comment   Select PO Supplement: Boost Plus Nutritional Drink - Any flavor    Frequency: All Meals        04/01/24 1238    04/01/24 0942  Diet Heart Healthy  Diet effective now         04/01/24 0941                   Medications Per DC med rec  Activities as tolerated   Follow-up Information       Nacho Kulkarni MD.  Schedule an appointment as soon as possible for a visit in 1 week(s).    Specialty: Internal Medicine  [facility] information:  [facility] [address] [place] [id number]  [phone]                           For further questions contact hospitalist office    Discharge time 33 minutes    For worsening symptoms, chest pain, shortness of breath, increased abdominal pain, high grade fever, stroke or stroke like symptoms, immediately go to the nearest Emergency Room or call 911 as soon as possible.      Gertrude Pappas DO  Department of Hospital Medicine  [facility]  04/02/2024

## 2024-04-02 NOTE — PROGRESS NOTES
Per nursing staff, pt and her family are anxious to be discharged. The patient is dressed and they are about to leave. Echo reviewed. LVEF intact. No wall motion abnormalities. Moderate AS noted. Troponin levels have trended down to normal. No further recommendations from cardiac standpoint. We will sign off.

## 2024-04-02 NOTE — PLAN OF CARE
04/02/24 1023   Discharge Assessment   Assessment Type Discharge Planning Assessment   Source of Information family   Reason For Admission fx of fifth metatarsal bone   Discharge Plan A Home Health   Discharge Plan B Home Health   DME Needed Upon Discharge  wheelchair  (Dgtr is requesting a script for a WC)   Transition of Care Barriers None     Pt is current with [facility]. Pt's dgtr is requesting a script for a WC.

## 2024-04-24 NOTE — PHYSICIAN QUERY
Question: Provider, due to the conflicting clinical picture, please clinically validate the diagnosis of           Type 2 NSTEMI         . If validated, please provide additional clinical support for the diagnosis.     Provider Query Response:  NSTEMI type ii confirmed by cardiology and labs

## 2024-06-05 ENCOUNTER — OFFICE VISIT (OUTPATIENT)
Dept: ORTHOPEDICS | Facility: CLINIC | Age: 89
End: 2024-06-05
Payer: MEDICARE

## 2024-06-05 ENCOUNTER — HOSPITAL ENCOUNTER (OUTPATIENT)
Dept: RADIOLOGY | Facility: CLINIC | Age: 89
Discharge: HOME | End: 2024-06-05
Payer: MEDICARE

## 2024-06-05 VITALS
SYSTOLIC BLOOD PRESSURE: 154 MMHG | HEART RATE: 70 BPM | WEIGHT: 119.94 LBS | HEIGHT: 59 IN | DIASTOLIC BLOOD PRESSURE: 63 MMHG | BODY MASS INDEX: 24.18 KG/M2

## 2024-06-05 DIAGNOSIS — M79.89 SWELLING OF LEFT HAND: ICD-10-CM

## 2024-06-05 DIAGNOSIS — M18.12 ARTHRITIS OF CARPOMETACARPAL (CMC) JOINT OF LEFT THUMB: Primary | ICD-10-CM

## 2024-06-05 DIAGNOSIS — M79.642 LEFT HAND PAIN: ICD-10-CM

## 2024-06-05 PROCEDURE — 99214 OFFICE O/P EST MOD 30 MIN: CPT | Mod: ,,,

## 2024-06-05 PROCEDURE — 73130 X-RAY EXAM OF HAND: CPT | Mod: LT,,,

## 2024-06-05 NOTE — PROGRESS NOTES
Orthopedic Clinic - Hand    Chief Complaint:  Left hand swelling      History of Present Illness: Yoli Villarreal is a 89 y.o. female here today for left hand swelling.  Patient states that she noticed a protrusion on her left wrist and hand swelling about 2-3 days ago.  She reports throbbing and shooting pain to the touch.  She says that ibuprofen has helped relieve the pain and she also takes tramadol at night.      Past Medical History:   Diagnosis Date    Acid reflux     High cholesterol     HTN (hypertension)     Unspecified macular degeneration         Past Surgical History:   Procedure Laterality Date    ADENOIDECTOMY      APPENDECTOMY       SECTION      EYE SURGERY  ?    FRACTURE SURGERY  ?    HYSTERECTOMY      NEPHRECTOMY      TONSILLECTOMY          Social History     Tobacco Use    Smoking status: Never    Smokeless tobacco: Never   Substance and Sexual Activity    Alcohol use: Never    Drug use: Never    Sexual activity: Never        Current Outpatient Medications   Medication Instructions    atorvastatin (LIPITOR) 10 mg, Oral, Nightly, Dosage unsure.    cholecalciferol (vitamin D3) (VITAMIN D3) 2,000 Units, Oral, Daily    diltiaZEM (TIAZAC) 360 mg, Oral, Daily    furosemide (LASIX) 10 mg/mL (alcohol free) solution Oral, Dosage unsure    meclizine (ANTIVERT) 5 mg, Oral, Every 12 hours PRN    meloxicam (MOBIC) 7.5 mg, Oral, Daily    pantoprazole (PROTONIX) 20 mg, Oral, 2 times daily before meals, Dosage unsure.    sertraline (ZOLOFT) 100 mg, Oral, Nightly, Dosage unsure.    sulfamethoxazole-trimethoprim 800-160mg (BACTRIM DS) 800-160 mg Tab 1 tablet, Oral, 2 times daily    traMADoL (ULTRAM-ER) 50 mg, Oral, Daily       Review of patient's allergies indicates:   Allergen Reactions    Iodine      IVP reaction    Shellfish containing products         Patient Care Team:  Nacho Kulkarni MD as PCP - General (Internal Medicine)     Review of Systems:    Constitution:   Denies chills, fever, and  "sweats.  HENT:   Denies headaches or blurry vision.  Cardiovascular:   Denies chest pain or irregular heart beat.  Respiratory:   Denies cough or shortness of breath.  Gastrointestinal:  Denies abdominal pain, nausea, or vomiting.  Musculoskeletal:   Denies muscle cramps.  Neurological:   Denies dizziness or focal weakness.  Psychiatric/Behavior: Normal mental status.  Hematology/Lymph:  Denies bleeding problem or easy bruising/bleeding.  Skin:    Denies rash or suspicious lesions.    Physical Examination:    Vital Signs:    Vitals:    06/05/24 1424   BP: (!) 154/63   Pulse: 70   Weight: 54.4 kg (119 lb 14.9 oz)   Height: 4' 11" (1.499 m)   Body mass index is 24.22 kg/m².    Constitution:   Well-developed, well nourished patient in no acute distress.  Neurological:   Alert and oriented x 3 and cooperative to examination.     Psychiatric/Behavior: Normal mental status.  Respiratory:   No shortness of breath. Non-labored breathing.  Eyes:    Extraoccular muscles intact.    Musculoskeletal Examination:     Left Upper Extremity:  [x] No open wounds or rashes.    [] Abnormal skin findings:  [x] Radial pulses 2+ is warm well perfused.      Tender erythematous nodule palpated on radial aspect of wrist  Erythema noted tracking from nodule up the volar aspect of the forearm to the elbow  Edema noted throughout the hand, wrist and forearm  Unable to make a full fist due to swelling  Negative grind test  Tender to palpation at thumb carpometacarpal joint      Imaging:   X-rays of the left hand three views shows thumb carpometacarpal osteoarthritis and soft tissue swelling of the hand     Assessment:  Left thumb CMC osteoarthritis    Plan:  After examining the patient, I am concerned about the erythematous nodule on the radial aspect of her wrist and erythema that is tracking down her forearm.  This could be a possible infection so I do not want to inject her thumb CMC today. I would like to get an MRI of the wrist and forearm " to further assess this.  I will also order a CRP, ESR and CBC.  She will follow up after MRI and labs to discuss the results.     Follow Up:  After MRI    X-Ray at Next Visit:  None

## 2024-06-06 ENCOUNTER — TELEPHONE (OUTPATIENT)
Dept: ORTHOPEDICS | Facility: CLINIC | Age: 89
End: 2024-06-06
Payer: MEDICARE

## 2024-06-06 NOTE — TELEPHONE ENCOUNTER
----- Message from Miquel Bolaños MD sent at 6/6/2024  9:06 AM CDT -----  Please call this patient and tell her to come NPO for possible surgery tomorrow depending on what the MRI shows

## 2024-06-06 NOTE — TELEPHONE ENCOUNTER
I called the patients healthcare power of attourney listed in the chart (Jose Saleh) and informed her of what is stated below by Dr. Bolaños. Nothing to eat or drink after midnight tonight incase surgery is needed depending on MRI results. Patients daughter/healthcare power of  voiced a clear understanding.     She request that I inform Dr. Bolaños that it took them a long time to do the MRI last night due to the patient moving. Jose was informed by the radiology tech that there is a lot of movement on the MRI.

## 2024-06-07 ENCOUNTER — ANESTHESIA (OUTPATIENT)
Dept: SURGERY | Facility: HOSPITAL | Age: 89
DRG: 603 | End: 2024-06-07
Payer: MEDICARE

## 2024-06-07 ENCOUNTER — ANESTHESIA EVENT (OUTPATIENT)
Dept: SURGERY | Facility: HOSPITAL | Age: 89
DRG: 603 | End: 2024-06-07
Payer: MEDICARE

## 2024-06-07 ENCOUNTER — OFFICE VISIT (OUTPATIENT)
Dept: ORTHOPEDICS | Facility: CLINIC | Age: 89
End: 2024-06-07
Payer: MEDICARE

## 2024-06-07 ENCOUNTER — HOSPITAL ENCOUNTER (INPATIENT)
Facility: HOSPITAL | Age: 89
LOS: 2 days | Discharge: HOME HEALTH | DRG: 603 | End: 2024-06-09
Attending: STUDENT IN AN ORGANIZED HEALTH CARE EDUCATION/TRAINING PROGRAM | Admitting: INTERNAL MEDICINE
Payer: MEDICARE

## 2024-06-07 VITALS
HEART RATE: 74 BPM | SYSTOLIC BLOOD PRESSURE: 177 MMHG | BODY MASS INDEX: 24.18 KG/M2 | WEIGHT: 119.94 LBS | DIASTOLIC BLOOD PRESSURE: 69 MMHG | HEIGHT: 59 IN

## 2024-06-07 DIAGNOSIS — L03.114 CELLULITIS OF LEFT HAND: ICD-10-CM

## 2024-06-07 DIAGNOSIS — L03.114 CELLULITIS OF LEFT UPPER EXTREMITY: ICD-10-CM

## 2024-06-07 DIAGNOSIS — L03.114 CELLULITIS OF LEFT HAND: Primary | ICD-10-CM

## 2024-06-07 LAB
ANION GAP SERPL CALC-SCNC: 12 MEQ/L
BUN SERPL-MCNC: 18.9 MG/DL (ref 9.8–20.1)
CALCIUM SERPL-MCNC: 9.4 MG/DL (ref 8.4–10.2)
CHLORIDE SERPL-SCNC: 107 MMOL/L (ref 98–111)
CO2 SERPL-SCNC: 22 MMOL/L (ref 23–31)
CREAT SERPL-MCNC: 0.83 MG/DL (ref 0.55–1.02)
CREAT/UREA NIT SERPL: 23
GFR SERPLBLD CREATININE-BSD FMLA CKD-EPI: >60 ML/MIN/1.73/M2
GLUCOSE SERPL-MCNC: 114 MG/DL (ref 75–121)
POTASSIUM SERPL-SCNC: 3.9 MMOL/L (ref 3.5–5.1)
SODIUM SERPL-SCNC: 141 MMOL/L (ref 136–145)

## 2024-06-07 PROCEDURE — 25000003 PHARM REV CODE 250: Performed by: NURSE ANESTHETIST, CERTIFIED REGISTERED

## 2024-06-07 PROCEDURE — 25000003 PHARM REV CODE 250

## 2024-06-07 PROCEDURE — 37000009 HC ANESTHESIA EA ADD 15 MINS: Performed by: STUDENT IN AN ORGANIZED HEALTH CARE EDUCATION/TRAINING PROGRAM

## 2024-06-07 PROCEDURE — 63600175 PHARM REV CODE 636 W HCPCS

## 2024-06-07 PROCEDURE — 36000706: Performed by: STUDENT IN AN ORGANIZED HEALTH CARE EDUCATION/TRAINING PROGRAM

## 2024-06-07 PROCEDURE — 63600175 PHARM REV CODE 636 W HCPCS: Performed by: STUDENT IN AN ORGANIZED HEALTH CARE EDUCATION/TRAINING PROGRAM

## 2024-06-07 PROCEDURE — 0J9H0ZZ DRAINAGE OF LEFT LOWER ARM SUBCUTANEOUS TISSUE AND FASCIA, OPEN APPROACH: ICD-10-PCS | Performed by: STUDENT IN AN ORGANIZED HEALTH CARE EDUCATION/TRAINING PROGRAM

## 2024-06-07 PROCEDURE — 37000008 HC ANESTHESIA 1ST 15 MINUTES: Performed by: STUDENT IN AN ORGANIZED HEALTH CARE EDUCATION/TRAINING PROGRAM

## 2024-06-07 PROCEDURE — 87070 CULTURE OTHR SPECIMN AEROBIC: CPT | Performed by: STUDENT IN AN ORGANIZED HEALTH CARE EDUCATION/TRAINING PROGRAM

## 2024-06-07 PROCEDURE — 36415 COLL VENOUS BLD VENIPUNCTURE: CPT | Performed by: INTERNAL MEDICINE

## 2024-06-07 PROCEDURE — 63600175 PHARM REV CODE 636 W HCPCS: Performed by: INTERNAL MEDICINE

## 2024-06-07 PROCEDURE — 80048 BASIC METABOLIC PNL TOTAL CA: CPT | Performed by: INTERNAL MEDICINE

## 2024-06-07 PROCEDURE — 25000003 PHARM REV CODE 250: Performed by: INTERNAL MEDICINE

## 2024-06-07 PROCEDURE — 25028 I&D F/ARM&/WRST DP ABSC/HMTM: CPT | Mod: LT,,, | Performed by: STUDENT IN AN ORGANIZED HEALTH CARE EDUCATION/TRAINING PROGRAM

## 2024-06-07 PROCEDURE — 87205 SMEAR GRAM STAIN: CPT | Performed by: STUDENT IN AN ORGANIZED HEALTH CARE EDUCATION/TRAINING PROGRAM

## 2024-06-07 PROCEDURE — 99214 OFFICE O/P EST MOD 30 MIN: CPT | Mod: ,,, | Performed by: STUDENT IN AN ORGANIZED HEALTH CARE EDUCATION/TRAINING PROGRAM

## 2024-06-07 PROCEDURE — 27000221 HC OXYGEN, UP TO 24 HOURS

## 2024-06-07 PROCEDURE — 11000001 HC ACUTE MED/SURG PRIVATE ROOM

## 2024-06-07 PROCEDURE — 87075 CULTR BACTERIA EXCEPT BLOOD: CPT | Performed by: STUDENT IN AN ORGANIZED HEALTH CARE EDUCATION/TRAINING PROGRAM

## 2024-06-07 PROCEDURE — 94761 N-INVAS EAR/PLS OXIMETRY MLT: CPT

## 2024-06-07 PROCEDURE — 63600175 PHARM REV CODE 636 W HCPCS: Performed by: NURSE ANESTHETIST, CERTIFIED REGISTERED

## 2024-06-07 PROCEDURE — 71000033 HC RECOVERY, INTIAL HOUR: Performed by: STUDENT IN AN ORGANIZED HEALTH CARE EDUCATION/TRAINING PROGRAM

## 2024-06-07 PROCEDURE — 36000707: Performed by: STUDENT IN AN ORGANIZED HEALTH CARE EDUCATION/TRAINING PROGRAM

## 2024-06-07 RX ORDER — VANCOMYCIN HYDROCHLORIDE 1 G/20ML
INJECTION, POWDER, LYOPHILIZED, FOR SOLUTION INTRAVENOUS
Status: DISCONTINUED | OUTPATIENT
Start: 2024-06-07 | End: 2024-06-07 | Stop reason: HOSPADM

## 2024-06-07 RX ORDER — VANCOMYCIN HYDROCHLORIDE 1 G/20ML
INJECTION, POWDER, LYOPHILIZED, FOR SOLUTION INTRAVENOUS
Status: DISPENSED
Start: 2024-06-07 | End: 2024-06-08

## 2024-06-07 RX ORDER — ETOMIDATE 2 MG/ML
INJECTION INTRAVENOUS
Status: DISCONTINUED | OUTPATIENT
Start: 2024-06-07 | End: 2024-06-07

## 2024-06-07 RX ORDER — FENTANYL CITRATE 50 UG/ML
INJECTION, SOLUTION INTRAMUSCULAR; INTRAVENOUS
Status: DISCONTINUED | OUTPATIENT
Start: 2024-06-07 | End: 2024-06-07

## 2024-06-07 RX ORDER — HYDROCODONE BITARTRATE AND ACETAMINOPHEN 5; 325 MG/1; MG/1
1 TABLET ORAL EVERY 4 HOURS PRN
Status: DISCONTINUED | OUTPATIENT
Start: 2024-06-07 | End: 2024-06-09 | Stop reason: HOSPADM

## 2024-06-07 RX ORDER — MORPHINE SULFATE 4 MG/ML
4 INJECTION, SOLUTION INTRAMUSCULAR; INTRAVENOUS
Status: CANCELLED | OUTPATIENT
Start: 2024-06-07

## 2024-06-07 RX ORDER — METOCLOPRAMIDE HYDROCHLORIDE 5 MG/ML
10 INJECTION INTRAMUSCULAR; INTRAVENOUS EVERY 6 HOURS PRN
Status: DISCONTINUED | OUTPATIENT
Start: 2024-06-07 | End: 2024-06-09 | Stop reason: HOSPADM

## 2024-06-07 RX ORDER — ONDANSETRON HYDROCHLORIDE 2 MG/ML
4 INJECTION, SOLUTION INTRAVENOUS EVERY 6 HOURS PRN
Status: DISCONTINUED | OUTPATIENT
Start: 2024-06-07 | End: 2024-06-09 | Stop reason: HOSPADM

## 2024-06-07 RX ORDER — SODIUM CHLORIDE, SODIUM GLUCONATE, SODIUM ACETATE, POTASSIUM CHLORIDE AND MAGNESIUM CHLORIDE 30; 37; 368; 526; 502 MG/100ML; MG/100ML; MG/100ML; MG/100ML; MG/100ML
INJECTION, SOLUTION INTRAVENOUS CONTINUOUS
OUTPATIENT
Start: 2024-06-07 | End: 2024-07-07

## 2024-06-07 RX ORDER — DEXAMETHASONE SODIUM PHOSPHATE 4 MG/ML
INJECTION, SOLUTION INTRA-ARTICULAR; INTRALESIONAL; INTRAMUSCULAR; INTRAVENOUS; SOFT TISSUE
Status: DISCONTINUED | OUTPATIENT
Start: 2024-06-07 | End: 2024-06-07

## 2024-06-07 RX ORDER — SODIUM CITRATE AND CITRIC ACID MONOHYDRATE 334; 500 MG/5ML; MG/5ML
30 SOLUTION ORAL
OUTPATIENT
Start: 2024-06-07

## 2024-06-07 RX ORDER — ONDANSETRON 4 MG/1
4 TABLET, ORALLY DISINTEGRATING ORAL EVERY 8 HOURS PRN
Status: DISCONTINUED | OUTPATIENT
Start: 2024-06-07 | End: 2024-06-09 | Stop reason: HOSPADM

## 2024-06-07 RX ORDER — MORPHINE SULFATE 4 MG/ML
4 INJECTION, SOLUTION INTRAMUSCULAR; INTRAVENOUS
Status: DISCONTINUED | OUTPATIENT
Start: 2024-06-07 | End: 2024-06-07

## 2024-06-07 RX ORDER — SODIUM CHLORIDE, SODIUM GLUCONATE, SODIUM ACETATE, POTASSIUM CHLORIDE AND MAGNESIUM CHLORIDE 30; 37; 368; 526; 502 MG/100ML; MG/100ML; MG/100ML; MG/100ML; MG/100ML
INJECTION, SOLUTION INTRAVENOUS CONTINUOUS
Status: DISCONTINUED | OUTPATIENT
Start: 2024-06-07 | End: 2024-06-09 | Stop reason: HOSPADM

## 2024-06-07 RX ORDER — BUPIVACAINE HYDROCHLORIDE 2.5 MG/ML
INJECTION, SOLUTION EPIDURAL; INFILTRATION; INTRACAUDAL
Status: DISPENSED
Start: 2024-06-07 | End: 2024-06-08

## 2024-06-07 RX ORDER — MEPERIDINE HYDROCHLORIDE 25 MG/ML
12.5 INJECTION INTRAMUSCULAR; INTRAVENOUS; SUBCUTANEOUS ONCE AS NEEDED
Status: DISCONTINUED | OUTPATIENT
Start: 2024-06-07 | End: 2024-06-07 | Stop reason: HOSPADM

## 2024-06-07 RX ORDER — METHOCARBAMOL 500 MG/1
500 TABLET, FILM COATED ORAL EVERY 6 HOURS PRN
Status: CANCELLED | OUTPATIENT
Start: 2024-06-07

## 2024-06-07 RX ORDER — METOCLOPRAMIDE HYDROCHLORIDE 5 MG/ML
10 INJECTION INTRAMUSCULAR; INTRAVENOUS EVERY 6 HOURS PRN
Status: CANCELLED | OUTPATIENT
Start: 2024-06-07

## 2024-06-07 RX ORDER — MUPIROCIN 20 MG/G
OINTMENT TOPICAL 2 TIMES DAILY
Status: CANCELLED | OUTPATIENT
Start: 2024-06-07 | End: 2024-06-10

## 2024-06-07 RX ORDER — MORPHINE SULFATE 4 MG/ML
4 INJECTION, SOLUTION INTRAMUSCULAR; INTRAVENOUS EVERY 6 HOURS PRN
Status: DISCONTINUED | OUTPATIENT
Start: 2024-06-07 | End: 2024-06-09 | Stop reason: HOSPADM

## 2024-06-07 RX ORDER — MIDAZOLAM HYDROCHLORIDE 1 MG/ML
INJECTION INTRAMUSCULAR; INTRAVENOUS
Status: DISCONTINUED | OUTPATIENT
Start: 2024-06-07 | End: 2024-06-07

## 2024-06-07 RX ORDER — MUPIROCIN 20 MG/G
OINTMENT TOPICAL 2 TIMES DAILY
Status: DISCONTINUED | OUTPATIENT
Start: 2024-06-07 | End: 2024-06-09 | Stop reason: HOSPADM

## 2024-06-07 RX ORDER — ONDANSETRON HYDROCHLORIDE 2 MG/ML
4 INJECTION, SOLUTION INTRAVENOUS EVERY 6 HOURS PRN
Status: CANCELLED | OUTPATIENT
Start: 2024-06-07

## 2024-06-07 RX ORDER — SODIUM CHLORIDE 9 MG/ML
INJECTION, SOLUTION INTRAVENOUS CONTINUOUS
Status: DISCONTINUED | OUTPATIENT
Start: 2024-06-07 | End: 2024-06-08

## 2024-06-07 RX ORDER — BUPIVACAINE HYDROCHLORIDE 2.5 MG/ML
INJECTION, SOLUTION EPIDURAL; INFILTRATION; INTRACAUDAL
Status: DISCONTINUED | OUTPATIENT
Start: 2024-06-07 | End: 2024-06-07 | Stop reason: HOSPADM

## 2024-06-07 RX ORDER — KETOROLAC TROMETHAMINE 30 MG/ML
INJECTION, SOLUTION INTRAMUSCULAR; INTRAVENOUS
Status: DISCONTINUED | OUTPATIENT
Start: 2024-06-07 | End: 2024-06-07

## 2024-06-07 RX ORDER — PANTOPRAZOLE SODIUM 20 MG/1
20 TABLET, DELAYED RELEASE ORAL
Status: DISCONTINUED | OUTPATIENT
Start: 2024-06-07 | End: 2024-06-09 | Stop reason: HOSPADM

## 2024-06-07 RX ORDER — HYDROCODONE BITARTRATE AND ACETAMINOPHEN 5; 325 MG/1; MG/1
1 TABLET ORAL EVERY 4 HOURS PRN
Status: CANCELLED | OUTPATIENT
Start: 2024-06-07

## 2024-06-07 RX ORDER — SODIUM CHLORIDE 9 MG/ML
INJECTION, SOLUTION INTRAVENOUS CONTINUOUS
Status: DISCONTINUED | OUTPATIENT
Start: 2024-06-07 | End: 2024-06-07

## 2024-06-07 RX ORDER — LIDOCAINE HYDROCHLORIDE 20 MG/ML
INJECTION INTRAVENOUS
Status: DISCONTINUED | OUTPATIENT
Start: 2024-06-07 | End: 2024-06-07

## 2024-06-07 RX ORDER — EPHEDRINE SULFATE 50 MG/ML
INJECTION, SOLUTION INTRAVENOUS
Status: DISCONTINUED | OUTPATIENT
Start: 2024-06-07 | End: 2024-06-07

## 2024-06-07 RX ORDER — SODIUM CITRATE AND CITRIC ACID MONOHYDRATE 334; 500 MG/5ML; MG/5ML
30 SOLUTION ORAL
Status: DISCONTINUED | OUTPATIENT
Start: 2024-06-07 | End: 2024-06-07 | Stop reason: HOSPADM

## 2024-06-07 RX ORDER — SODIUM CHLORIDE 0.9 % (FLUSH) 0.9 %
10 SYRINGE (ML) INJECTION EVERY 12 HOURS PRN
Status: DISCONTINUED | OUTPATIENT
Start: 2024-06-07 | End: 2024-06-09 | Stop reason: HOSPADM

## 2024-06-07 RX ORDER — PROPOFOL 10 MG/ML
INJECTION, EMULSION INTRAVENOUS
Status: DISCONTINUED | OUTPATIENT
Start: 2024-06-07 | End: 2024-06-07

## 2024-06-07 RX ORDER — MECLIZINE HCL 12.5 MG 12.5 MG/1
12.5 TABLET ORAL EVERY 12 HOURS PRN
Status: DISCONTINUED | OUTPATIENT
Start: 2024-06-07 | End: 2024-06-09 | Stop reason: HOSPADM

## 2024-06-07 RX ORDER — DILTIAZEM HYDROCHLORIDE 180 MG/1
360 CAPSULE, COATED, EXTENDED RELEASE ORAL DAILY
Status: DISCONTINUED | OUTPATIENT
Start: 2024-06-08 | End: 2024-06-09 | Stop reason: HOSPADM

## 2024-06-07 RX ORDER — SODIUM CHLORIDE 9 MG/ML
INJECTION, SOLUTION INTRAVENOUS CONTINUOUS
Status: CANCELLED | OUTPATIENT
Start: 2024-06-07

## 2024-06-07 RX ORDER — SERTRALINE HYDROCHLORIDE 50 MG/1
100 TABLET, FILM COATED ORAL NIGHTLY
Status: DISCONTINUED | OUTPATIENT
Start: 2024-06-07 | End: 2024-06-09 | Stop reason: HOSPADM

## 2024-06-07 RX ORDER — HYDROMORPHONE HYDROCHLORIDE 2 MG/ML
0.2 INJECTION, SOLUTION INTRAMUSCULAR; INTRAVENOUS; SUBCUTANEOUS EVERY 5 MIN PRN
Status: DISCONTINUED | OUTPATIENT
Start: 2024-06-07 | End: 2024-06-07 | Stop reason: HOSPADM

## 2024-06-07 RX ORDER — LIDOCAINE HYDROCHLORIDE 10 MG/ML
1 INJECTION, SOLUTION EPIDURAL; INFILTRATION; INTRACAUDAL; PERINEURAL ONCE
Status: DISCONTINUED | OUTPATIENT
Start: 2024-06-07 | End: 2024-06-07 | Stop reason: HOSPADM

## 2024-06-07 RX ORDER — TRAMADOL HYDROCHLORIDE 50 MG/1
50 TABLET ORAL EVERY 4 HOURS PRN
Status: DISCONTINUED | OUTPATIENT
Start: 2024-06-07 | End: 2024-06-09 | Stop reason: HOSPADM

## 2024-06-07 RX ORDER — ONDANSETRON HYDROCHLORIDE 2 MG/ML
INJECTION, SOLUTION INTRAVENOUS
Status: DISCONTINUED | OUTPATIENT
Start: 2024-06-07 | End: 2024-06-07

## 2024-06-07 RX ORDER — LIDOCAINE HYDROCHLORIDE 10 MG/ML
1 INJECTION, SOLUTION EPIDURAL; INFILTRATION; INTRACAUDAL; PERINEURAL ONCE
OUTPATIENT
Start: 2024-06-07 | End: 2024-06-07

## 2024-06-07 RX ORDER — METHOCARBAMOL 500 MG/1
500 TABLET, FILM COATED ORAL EVERY 6 HOURS PRN
Status: DISCONTINUED | OUTPATIENT
Start: 2024-06-07 | End: 2024-06-09 | Stop reason: HOSPADM

## 2024-06-07 RX ADMIN — PANTOPRAZOLE SODIUM 20 MG: 20 TABLET, DELAYED RELEASE ORAL at 08:06

## 2024-06-07 RX ADMIN — PIPERACILLIN SODIUM AND TAZOBACTAM SODIUM 4.5 G: 4; .5 INJECTION, POWDER, LYOPHILIZED, FOR SOLUTION INTRAVENOUS at 07:06

## 2024-06-07 RX ADMIN — MUPIROCIN: 20 OINTMENT TOPICAL at 09:06

## 2024-06-07 RX ADMIN — PROPOFOL 30 MG: 10 INJECTION, EMULSION INTRAVENOUS at 02:06

## 2024-06-07 RX ADMIN — SERTRALINE HYDROCHLORIDE 100 MG: 50 TABLET ORAL at 08:06

## 2024-06-07 RX ADMIN — SODIUM CHLORIDE, SODIUM GLUCONATE, SODIUM ACETATE, POTASSIUM CHLORIDE AND MAGNESIUM CHLORIDE: 526; 502; 368; 37; 30 INJECTION, SOLUTION INTRAVENOUS at 02:06

## 2024-06-07 RX ADMIN — SODIUM CHLORIDE: 9 INJECTION, SOLUTION INTRAVENOUS at 05:06

## 2024-06-07 RX ADMIN — VANCOMYCIN HYDROCHLORIDE 1250 MG: 1.25 INJECTION, POWDER, LYOPHILIZED, FOR SOLUTION INTRAVENOUS at 05:06

## 2024-06-07 RX ADMIN — ETOMIDATE 8 MG: 2 INJECTION INTRAVENOUS at 02:06

## 2024-06-07 RX ADMIN — MIDAZOLAM 2 MG: 1 INJECTION INTRAMUSCULAR; INTRAVENOUS at 02:06

## 2024-06-07 RX ADMIN — KETOROLAC TROMETHAMINE 30 MG: 30 INJECTION, SOLUTION INTRAMUSCULAR at 03:06

## 2024-06-07 RX ADMIN — FENTANYL CITRATE 50 MCG: 50 INJECTION, SOLUTION INTRAMUSCULAR; INTRAVENOUS at 02:06

## 2024-06-07 RX ADMIN — LIDOCAINE HYDROCHLORIDE 50 MG: 20 INJECTION INTRAVENOUS at 02:06

## 2024-06-07 RX ADMIN — DEXAMETHASONE SODIUM PHOSPHATE 4 MG: 4 INJECTION, SOLUTION INTRA-ARTICULAR; INTRALESIONAL; INTRAMUSCULAR; INTRAVENOUS; SOFT TISSUE at 02:06

## 2024-06-07 RX ADMIN — SODIUM CHLORIDE: 9 INJECTION, SOLUTION INTRAVENOUS at 07:06

## 2024-06-07 RX ADMIN — EPHEDRINE SULFATE 10 MG: 50 INJECTION INTRAVENOUS at 02:06

## 2024-06-07 RX ADMIN — ONDANSETRON HYDROCHLORIDE 4 MG: 2 SOLUTION INTRAMUSCULAR; INTRAVENOUS at 02:06

## 2024-06-07 RX ADMIN — CEFAZOLIN 2 G: 2 INJECTION, POWDER, FOR SOLUTION INTRAMUSCULAR; INTRAVENOUS at 02:06

## 2024-06-07 NOTE — OP NOTE
Operative Note    Patient Information:  Yoli Villarreal    Date of Surgery:  06/07/2024    Surgeon:  Miquel Bolaños MD    Assistant:  None    Pre-operative Diagnosis:  Left forearm infection    Post-operative Diagnosis:  Left forearm abscess    Procedure Performed:  Incision and drainage left forearm abscess    Anesthesia:  General    Complications:  None    Blood Loss:  See anesthesia record    Specimens:  Left wrist tissue culture and swabs    Implants:  * No implants in log *     Indications for Procedure:  Yoli Villarreal is a 89 y.o. female that presented to clinic with pain in her left wrist.  Inflammatory markers were elevated and she had cellulitis in the forearm suggesting an infection in the wrist.  She was taken to the operating room for incision and drainage.    Risks and benefits of the procedure were discussed with the patient. I explained that surgery and the nature of their condition are not without risks. These include, but are not limited to, bleeding, infection, neurovascular compromise, wound complications, scarring, cosmetic defects, need for later and/or repeated surgeries, pain, loss of ROM, loss of function, deformity, functional abnormalities, stiffness, thromboembolic complications, compartment syndrome, loss of limb, loss of life, anesthetic complications, and other imponderables. I explained that these can occur despite the adequacy of treatments rendered, and that their risks are heightened given the nature of their condition. They verbalized understanding. They would like to continue with surgery at this time. If appropriate family was involved with surgical discussion. The patient expressed understanding of and agreement with the plan. Informed consent was obtained and signed prior going to the operative room.    Procedure in Detail:  Patient was brought to the operating room by the anesthesia team. Anesthesia was administered per the anesthesia record. The patient was left on the  stretcher and a hand table was placed on the side of the bed.     Time out was performed and all parties present agreed with correct patient, correct procedure, correct side, correct site. The operative extremity was prepped and draped in standard normal fashion.     Pre-incision antibiotics were administered prior to skin incision. The tourniquet was inflated to 250mm HG.     A longitudinal incision was made over the volar radial wrist.  Dissection was carried down to the subcutaneous tissue.  A pocket of pus was identified over the radial aspect of the volar wrist.  Culture swabs were obtained.  Tissue culture was also sent.  This was tracking into the 1st dorsal compartment.  The 1st dorsal compartment was released and pus was expressed around the 1st dorsal compartment tendons.  This was copiously irrigated with normal saline.  Next a longitudinal incision was made over the dorsal wrist.  Dissection was carried down to the wrist capsule.  A dorsal arthrotomy was made to confirm that there was no pus within the wrist joint itself.  There was no pus in the wrist joint.  All wounds were copiously irrigated with normal saline.  Tourniquet was let down.  Hemostasis was achieved.  There was a small branch off of the radial artery that had been cut and this was tied with 8 0 nylon suture to obtain hemostasis.  Vancomycin powder was placed into the wound.  0.25% Marcaine was injected into the wound.  The wound was closed with 3-0 nylon suture.  A soft sterile dressing was applied.    Patient was awoken from anesthesia without complications and transferred to the recovery room in stable condition.       Post-operative Plan:  Patient will be admitted to the floor where she will get broad-spectrum antibiotics until cultures result.  We will then tailor the appropriate antibiotics and discharge her once she stabilizes.

## 2024-06-07 NOTE — PROGRESS NOTES
Orthopedic Clinic - Hand    Chief Complaint:  Left hand swelling      History of Present Illness: Yoli Villarreal is a 89 y.o. female here today for left hand swelling.  I saw her in my clinic last time where I ordered a MRI for suspected infection of the left volar wrist.  Patient states that her pain is worsening.  She is noticing redness tracking up the forearm.  No fevers or chills      Past Medical History:   Diagnosis Date    Acid reflux     High cholesterol     HTN (hypertension)     Unspecified macular degeneration         Past Surgical History:   Procedure Laterality Date    ADENOIDECTOMY      APPENDECTOMY       SECTION      EYE SURGERY  ?    FRACTURE SURGERY  ?    HYSTERECTOMY      NEPHRECTOMY      TONSILLECTOMY          Social History     Tobacco Use    Smoking status: Never    Smokeless tobacco: Never   Substance and Sexual Activity    Alcohol use: Never    Drug use: Never    Sexual activity: Never        Current Outpatient Medications   Medication Instructions    atorvastatin (LIPITOR) 10 mg, Oral, Nightly, Dosage unsure.    cholecalciferol (vitamin D3) (VITAMIN D3) 2,000 Units, Oral, Daily    diltiaZEM (TIAZAC) 360 mg, Oral, Daily    furosemide (LASIX) 10 mg/mL (alcohol free) solution Oral, Dosage unsure    meclizine (ANTIVERT) 5 mg, Oral, Every 12 hours PRN    meloxicam (MOBIC) 7.5 mg, Oral, Daily    pantoprazole (PROTONIX) 20 mg, Oral, 2 times daily before meals, Dosage unsure.    sertraline (ZOLOFT) 100 mg, Oral, Nightly, Dosage unsure.    sulfamethoxazole-trimethoprim 800-160mg (BACTRIM DS) 800-160 mg Tab 1 tablet, Oral, 2 times daily    traMADoL (ULTRAM-ER) 50 mg, Oral, Daily       Review of patient's allergies indicates:   Allergen Reactions    Iodine      IVP reaction    Shellfish containing products         Patient Care Team:  Nacho Kulkarni MD as PCP - General (Internal Medicine)     Review of Systems:    Constitution:   Denies chills, fever, and sweats.  HENT:   Denies  "headaches or blurry vision.  Cardiovascular:   Denies chest pain or irregular heart beat.  Respiratory:   Denies cough or shortness of breath.  Gastrointestinal:  Denies abdominal pain, nausea, or vomiting.  Musculoskeletal:   Denies muscle cramps.  Neurological:   Denies dizziness or focal weakness.  Psychiatric/Behavior: Normal mental status.  Hematology/Lymph:  Denies bleeding problem or easy bruising/bleeding.  Skin:    Denies rash or suspicious lesions.    Physical Examination:    Vital Signs:    Vitals:    06/07/24 0948   BP: (!) 177/69   Pulse: 74   Weight: 54.4 kg (119 lb 14.9 oz)   Height: 4' 11" (1.499 m)   Body mass index is 24.22 kg/m².    Constitution:   Well-developed, well nourished patient in no acute distress.  Neurological:   Alert and oriented x 3 and cooperative to examination.     Psychiatric/Behavior: Normal mental status.  Respiratory:   No shortness of breath. Non-labored breathing.  Eyes:    Extraoccular muscles intact.    Musculoskeletal Examination:     Left Upper Extremity:  [x] No open wounds or rashes.    [] Abnormal skin findings:  [x] Radial pulses 2+ is warm well perfused.      Tender erythematous nodule palpated on radial aspect of wrist  Erythema noted tracking from nodule up the volar aspect of the forearm to the elbow  Edema noted throughout the hand, wrist and forearm  Unable to make a full fist due to swelling  Negative grind test  Tender to palpation at thumb carpometacarpal joint      Imaging:   X-rays of the left hand three views shows thumb carpometacarpal osteoarthritis and soft tissue swelling of the hand  MRI of the left wrist and forearm show soft tissue swelling over the volar radial wrist    CRP is 20.53  ESR is 25     Assessment:  Left volar wrist infection    Plan:  I worry that there was an infection in the volar wrist soft tissues.  She does have a component of cellulitis but there has a point of maximal tenderness over the volar radial wrist with some swelling.  " The MRI does not show fluid collection but there is some sort of cystic mass.  I will make an incision to remove this and send this for culture since this is where I think most of her pain and symptoms are coming from.  I will then admit her overnight for IV antibiotics and depending on if she was improving discharge her this weekend.  I had an extensive conversation with the patient and her family regarding the risks especially the risks of anesthesia.  I explained that if we do not treat this and this has an infection in his good spread more proximally and worsen.  There has a risk of her becoming bacteremic and septic and having complications such as amputation or death.  If we take her to surgery the risks are those with anesthesia such as cardiac and pulmonary complications.  There has also a risk that she will need multiple operations to treat this infection.  I will take her to the surgery today      Follow Up:  After surgery    X-Ray at Next Visit:  None

## 2024-06-07 NOTE — INTERVAL H&P NOTE
The patient has been examined and the H&P has been reviewed:    I concur with the findings and no changes have occurred since H&P was written.    Surgery risks, benefits and alternative options discussed and understood by patient/family.          Active Hospital Problems    Diagnosis  POA    Cellulitis of left hand [L03.114]  Yes      Resolved Hospital Problems   No resolved problems to display.

## 2024-06-07 NOTE — H&P
[facility]    [facility] Medicine History & Physical Examination       Patient Name: Yoli Villarreal  MRN: [id number]  Patient Class: IP- Inpatient   Admission Date: 2024   Admitting Physician: RON Service   Length of Stay: 0  Attending Physician: Roberto Roque MD  Primary Care Provider: Nacho Kulkarni MD  Face-to-Face encounter date: 2024    Code Status: Full Code    Chief Complaint: Joint Swelling          HISTORY OF PRESENT ILLNESS:   Yoli Villarreal is a 89 y.o. female who  has a past medical history of Acid reflux, High cholesterol, HTN (hypertension), Skin cancer, and Unspecified macular degeneration.. The patient presented to [facility] on 2024 with a primary complaint of left hand pain due to a suspected infection. She was evaluated by Dr. Bolaños in clinic today. Her pain was worsening and she had redness tracking up her forearm so he took her to the OR for a washout. I was asked to admit the patient for ongoing monitoring. She doesn't recall an inciting event that caused the infection. Denies history of cellulitis or skin abscess. Her daughter is at the bedside.     PAST MEDICAL HISTORY:     Past Medical History:   Diagnosis Date    Acid reflux     High cholesterol     HTN (hypertension)     Skin cancer     Unspecified macular degeneration        PAST SURGICAL HISTORY:     Past Surgical History:   Procedure Laterality Date    ADENOIDECTOMY      APPENDECTOMY       SECTION      EYE SURGERY  ?    FRACTURE SURGERY  ?    HYSTERECTOMY      NEPHRECTOMY      TONSILLECTOMY         ALLERGIES:   Iodine and Shellfish containing products    FAMILY HISTORY:   family history includes Cancer in her mother; No Known Problems in her brother, father, and sister; Ovarian cancer in her mother.    SOCIAL HISTORY:     Social History     Tobacco Use    Smoking status: Never    Smokeless tobacco: Never   Substance Use Topics    Alcohol use: Never        HOME  MEDICATIONS:     Prior to Admission medications    Medication Sig Start Date End Date Taking? Authorizing Provider   atorvastatin (LIPITOR) 10 MG tablet Take 10 mg by mouth every evening. Dosage unsure.   Yes Provider, Historical   cholecalciferol, vitamin D3, (VITAMIN D3) 50 mcg (2,000 unit) Tab Take 1 tablet (2,000 Units total) by mouth once daily. 4/2/24 7/1/24 Yes Bux, Gertrude, DO   diltiaZEM (TIAZAC) 360 MG Cs24 Take 360 mg by mouth once daily.   Yes Provider, Historical   furosemide (LASIX) 10 mg/mL (alcohol free) solution Take by mouth. Dosage unsure   Yes Provider, Historical   meclizine (ANTIVERT) 50 MG tablet Take 5 mg by mouth every 12 (twelve) hours as needed.   Yes Provider, Historical   meloxicam (MOBIC) 7.5 MG tablet Take 7.5 mg by mouth once daily.   Yes Provider, Historical   pantoprazole (PROTONIX) 20 MG tablet Take 20 mg by mouth 2 (two) times daily before meals. Dosage unsure.   Yes Provider, Historical   sertraline (ZOLOFT) 100 MG tablet Take 100 mg by mouth every evening. Dosage unsure.   Yes Provider, Historical   traMADoL (ULTRAM-ER) 100 MG Tb24 Take 50 mg by mouth once daily.   Yes Provider, Historical   sulfamethoxazole-trimethoprim 800-160mg (BACTRIM DS) 800-160 mg Tab Take 1 tablet by mouth 2 (two) times daily. 3/28/24 6/7/24  Provider, Historical       REVIEW OF SYSTEMS:   Except as documented, all other systems reviewed and negative   Review of Systems   All other systems reviewed and are negative.        PHYSICAL EXAM:     VITAL SIGNS: 24 HRS MIN & MAX LAST   Temp  Min: 97.7 °F (36.5 °C)  Max: 98.3 °F (36.8 °C) 97.7 °F (36.5 °C)   BP  Min: 130/69  Max: 177/69 135/69   Pulse  Min: 60  Max: 81  67   Resp  Min: 16  Max: 20 16   SpO2  Min: 85 %  Max: 98 % (!) 94 %       General appearance: Well-developed, well-nourished female in no apparent distress.  HENT: Atraumatic head. Moist mucous membranes of oral cavity.  Eyes: Normal extraocular movements.   Neck: Supple.   Lungs: Clear to  auscultation bilaterally. No wheezing present.   Heart: Regular rate and rhythm. S1 and S2 present with no murmurs/gallop/rub. No pedal edema. No JVD present.   Abdomen: Soft, non-distended, non-tender. No rebound tenderness/guarding. Bowel sounds are normal.   Extremities: Left hand bandaged. No cyanosis, clubbing, or edema.  Skin: No Rash.   Neuro: Motor and sensory exams grossly intact. Good tone.   Psych/mental status: Appropriate mood and affect. Responds appropriately to questions.     LABS AND IMAGING:     Recent Labs   Lab 06/05/24  1525   WBC 5.40   RBC 4.03*   HGB 11.6*   HCT 35.2*   MCV 87.3   MCH 28.8   MCHC 33.0   RDW 16.0   *   MPV 11.9*       Recent Labs   Lab 06/07/24  1713      K 3.9      CO2 22*   BUN 18.9   CREATININE 0.83   CALCIUM 9.4       Microbiology Results (last 7 days)       Procedure Component Value Units Date/Time    Anaerobic Culture [[id number]] Collected: 06/07/24 1436    Order Status: Sent Specimen: Tissue from Wrist, Left Updated: 06/07/24 1534    Gram Stain [[id number]] Collected: 06/07/24 1436    Order Status: Sent Specimen: Tissue from Wrist, Left Updated: 06/07/24 1534    Tissue Culture - Aerobic [[id number]] Collected: 06/07/24 1436    Order Status: Sent Specimen: Tissue from Wrist, Left Updated: 06/07/24 1534    Fungal Culture [[id number]] Collected: 06/07/24 1436    Order Status: Sent Specimen: Tissue from Wrist, Left Updated: 06/07/24 1534    Gram Stain [[id number]] Collected: 06/07/24 1433    Order Status: Sent Specimen: Tissue from Wrist, Left Updated: 06/07/24 1534    Anaerobic Culture [[id number]] Collected: 06/07/24 1433    Order Status: Sent Specimen: Tissue from Wrist, Left Updated: 06/07/24 1534    Fungal Culture [[id number]] Collected: 06/07/24 1433    Order Status: Sent Specimen: Tissue from Wrist, Left Updated: 06/07/24 1534    Tissue Culture - Aerobic [[id number]] Collected: 06/07/24 1433    Order Status: Sent Specimen: Tissue from Wrist, Left  Updated: 06/07/24 1534    Anaerobic Culture [[id number]] Collected: 06/07/24 1433    Order Status: Sent Specimen: Tissue from Wrist, Left Updated: 06/07/24 1534    Gram Stain [[id number]] Collected: 06/07/24 1433    Order Status: Sent Specimen: Tissue from Wrist, Left Updated: 06/07/24 1534    Tissue Culture - Aerobic [[id number]] Collected: 06/07/24 1433    Order Status: Sent Specimen: Tissue from Wrist, Left Updated: 06/07/24 1534    Fungal Culture [[id number]] Collected: 06/07/24 1433    Order Status: Sent Specimen: Tissue from Wrist, Left Updated: 06/07/24 1534    Gram Stain [[id number]]     Order Status: Sent Specimen: Tissue from Wrist, Left     Anaerobic Culture [[id number]]     Order Status: Sent Specimen: Tissue from Wrist, Left     Tissue Culture - Aerobic [[id number]]     Order Status: Sent Specimen: Tissue from Wrist, Left     Fungal Culture [[id number]]     Order Status: Sent Specimen: Tissue from Wrist, Left     Gram Stain [[id number]]     Order Status: Sent Specimen: SWAB from Wrist, Left     Anaerobic Culture [[id number]]     Order Status: Sent Specimen: SWAB from Wrist, Left     Tissue Culture - Aerobic [[id number]]     Order Status: Sent Specimen: Tissue from Wrist, Left     Fungal Culture [[id number]]     Order Status: Sent Specimen: SWAB from Wrist, Left     Gram Stain [[id number]] Collected: 06/07/24 1446    Order Status: Canceled Specimen: SWAB from Wrist, Left     Anaerobic Culture [[id number]] Collected: 06/07/24 1446    Order Status: Canceled Specimen: SWAB from Wrist, Left     Tissue Culture - Aerobic [[id number]] Collected: 06/07/24 1446    Order Status: Canceled Specimen: Tissue from Wrist, Left     Fungal Culture [[id number]] Collected: 06/07/24 1446    Order Status: Canceled Specimen: SWAB from Wrist, Left              MRI Forearm Without Contrast Left  Narrative: EXAMINATION:  MRI WRIST WITHOUT CONTRAST LEFT; MRI FOREARM WITHOUT CONTRAST LEFT    CLINICAL HISTORY:  89-year-old woman  with left hand/wrist pain and suspected soft tissue infection.    TECHNIQUE:  Axial PD, axial T2 fat sat, coronal T2 fat sat, coronal T1, sagittal T1, and coronal T2 3D GRE non-contrast 1.5 T magnetic resonance imaging was performed through the left wrist.  Additional sagittal STIR, sagittal T1, axial PD, axial T2 fat sat, coronal T1, and coronal STIR non-contrast 1.5T magnetic resonance imaging was performed through the left forearm.    COMPARISON:  None.  Correlation is made with left hand radiographs of 06/05/2024.    FINDINGS:  There is prominent soft tissue swelling and diffuse subcutaneous edema along the visualized right thumb and radial hand/wrist along with additional more mild diffuse soft tissue swelling and subcutaneous edema at the dorsal hand/wrist and volar distal forearm and wrist.  There is no abscess, focal fluid collection, or hematoma.  There are no findings of osteomyelitis.    There is severe degenerative joint space narrowing with thin subchondral degenerative marrow change and marked marginal osteophyte formation at the thumb CMC articulation.  There is no acute joint space abnormality such as septic arthritis.  There is no significant abnormality at the radiocarpal or distal radioulnar joints.  The triangular fibrocartilage complex demonstrates mild diffuse degenerative change without tear.  The extensor tendon compartments are intact and with no significant abnormality.  The flexor tendons and median nerve through the carpal tunnel are also intact and with no significant abnormality.  There is no space-occupying lesion such as ganglion cyst at the wrist and no solid mass.  There is no muscle edema or atrophy.    The left forearm imaging is degraded by persistent patient motion.  The subcutaneous edema and mild soft tissue swelling at the volar wrist extends proximally to the mid forearm.  There is no additional pathology along the left forearm.  The radius and ulna are intact and there are  no findings of osteomyelitis at the forearm or elbow.  No acute pathology is identified at the elbow.  Impression: Diffuse soft tissue swelling and edema at the left thumb and radial wrist more advanced than dorsal hand and volar wrist and distal forearm.  No abscess.  No osteomyelitis or septic arthritis.    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    No additional acute pathology identified at the left wrist or forearm.    Electronically signed by: Teofilo Valenzuela  Date:    06/06/2024  Time:    10:26  MRI Wrist Joint Without Contrast Left  Narrative: EXAMINATION:  MRI WRIST WITHOUT CONTRAST LEFT; MRI FOREARM WITHOUT CONTRAST LEFT    CLINICAL HISTORY:  89-year-old woman with left hand/wrist pain and suspected soft tissue infection.    TECHNIQUE:  Axial PD, axial T2 fat sat, coronal T2 fat sat, coronal T1, sagittal T1, and coronal T2 3D GRE non-contrast 1.5 T magnetic resonance imaging was performed through the left wrist.  Additional sagittal STIR, sagittal T1, axial PD, axial T2 fat sat, coronal T1, and coronal STIR non-contrast 1.5T magnetic resonance imaging was performed through the left forearm.    COMPARISON:  None.  Correlation is made with left hand radiographs of 06/05/2024.    FINDINGS:  There is prominent soft tissue swelling and diffuse subcutaneous edema along the visualized right thumb and radial hand/wrist along with additional more mild diffuse soft tissue swelling and subcutaneous edema at the dorsal hand/wrist and volar distal forearm and wrist.  There is no abscess, focal fluid collection, or hematoma.  There are no findings of osteomyelitis.    There is severe degenerative joint space narrowing with thin subchondral degenerative marrow change and marked marginal osteophyte formation at the thumb CMC articulation.  There is no acute joint space abnormality such as septic arthritis.  There is no significant abnormality at the radiocarpal or distal radioulnar joints.  The  triangular fibrocartilage complex demonstrates mild diffuse degenerative change without tear.  The extensor tendon compartments are intact and with no significant abnormality.  The flexor tendons and median nerve through the carpal tunnel are also intact and with no significant abnormality.  There is no space-occupying lesion such as ganglion cyst at the wrist and no solid mass.  There is no muscle edema or atrophy.    The left forearm imaging is degraded by persistent patient motion.  The subcutaneous edema and mild soft tissue swelling at the volar wrist extends proximally to the mid forearm.  There is no additional pathology along the left forearm.  The radius and ulna are intact and there are no findings of osteomyelitis at the forearm or elbow.  No acute pathology is identified at the elbow.  Impression: Diffuse soft tissue swelling and edema at the left thumb and radial wrist more advanced than dorsal hand and volar wrist and distal forearm.  No abscess.  No osteomyelitis or septic arthritis.    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    No additional acute pathology identified at the left wrist or forearm.    Electronically signed by: Teofilo Valenzuela  Date:    06/06/2024  Time:    10:26      Recent Results (from the past 24 hour(s))   Basic Metabolic Panel    Collection Time: 06/07/24  5:13 PM   Result Value Ref Range    Sodium 141 136 - 145 mmol/L    Potassium 3.9 3.5 - 5.1 mmol/L    Chloride 107 98 - 111 mmol/L    CO2 22 (L) 23 - 31 mmol/L    Glucose 114 75 - 121 mg/dL    Blood Urea Nitrogen 18.9 9.8 - 20.1 mg/dL    Creatinine 0.83 0.55 - 1.02 mg/dL    BUN/Creatinine Ratio 23     Calcium 9.4 8.4 - 10.2 mg/dL    Anion Gap 12.0 mEq/L    eGFR >60 mL/min/1.73/m2     __________________________________________________________________________  INPATIENT LIST OF MEDICATIONS     Scheduled Meds:   BUPivacaine (PF) 0.25% (2.5 mg/ml)        [START ON 6/8/2024] diltiaZEM  360 mg Oral Daily     mupirocin   Nasal BID    pantoprazole  20 mg Oral BID AC    piperacillin-tazobactam (Zosyn) IV (PEDS and ADULTS) (extended infusion is not appropriate)  4.5 g Intravenous Q12H    sertraline  100 mg Oral QHS    vancomycin        vancomycin (VANCOCIN) IV (PEDS and ADULTS)  1,250 mg Intravenous Once               ASSESSMENT & PLAN:     Left wrist infection  HTN  HLD  GERD    Plan  Cont antibiotics  Review and resume appropriate home meds  Check am labs.  F/u wound cx        Roberto Roque MD   06/07/2024

## 2024-06-07 NOTE — ANESTHESIA POSTPROCEDURE EVALUATION
Anesthesia Post Evaluation    Patient: Yoli Villarreal    Procedure(s) Performed: Procedure(s) (LRB):  IRRIGATION AND DEBRIDEMENT, UPPER EXTREMITY (Left)    Final Anesthesia Type: general (/Regional//MAC)      Patient location during evaluation: PACU  Post-procedure mental status: @ basline.  Pain management: adequate    PONV status: See postop meds for drugs used to control n/v if any.  Anesthetic complications: no      Cardiovascular status: blood pressure returned to baseline  Respiratory status: @ baseline.  Hydration status: euvolemic                Vitals Value Taken Time   /69 06/07/24 1554   Temp 98 06/07/24 1605   Pulse 65 06/07/24 1558   Resp 16 06/07/24 1525   SpO2 97 % 06/07/24 1558         Event Time   Out of Recovery 15:35:00         Pain/Victor M Score: Victor M Score: 9 (6/7/2024  3:19 PM)

## 2024-06-07 NOTE — TRANSFER OF CARE
"Anesthesia Transfer of Care Note    Patient: Yoli Villarreal    Procedure(s) Performed: Procedure(s) (LRB):  IRRIGATION AND DEBRIDEMENT, UPPER EXTREMITY (Left)    Patient location: PACU    Anesthesia Type: general    Transport from OR: Transported from OR on room air with adequate spontaneous ventilation    Post pain: adequate analgesia    Post assessment: no apparent anesthetic complications and tolerated procedure well    Post vital signs: stable    Level of consciousness: awake and alert    Nausea/Vomiting: no nausea/vomiting    Complications: none    Transfer of care protocol was followed      Last vitals: Visit Vitals  BP (!) 163/61   Pulse 64   Temp 36.8 °C (98.3 °F)   Resp 20   Ht 4' 11" (1.499 m)   SpO2 95%   Breastfeeding No   BMI 24.22 kg/m²     "

## 2024-06-07 NOTE — ANESTHESIA PREPROCEDURE EVALUATION
06/07/2024  Yoli Villarreal is a 89 y.o., female with a PMHx of HTN, GERD, vertigo, macular degeneration, and other medical problems noted in the EMR    Follows up with her primary doc on a regular basis and was recently seen by cardiologist Dr. Smith due to fall and workup was negative.       Pre-op Assessment    I have reviewed the Patient Summary Reports.     I have reviewed the Nursing Notes. I have reviewed the NPO Status.   I have reviewed the Medications.     Review of Systems  Anesthesia Hx:  No problems with previous Anesthesia                Cardiovascular:  Exercise tolerance: good                    4/1/2024...  ·  Left Ventricle: The left ventricle is normal in size. Mildly increased wall thickness. There is normal systolic function with a visually estimated ejection fraction of 55 - 60%. Grade I diastolic dysfunction.  ·  Right Ventricle: Normal right ventricular cavity size. Systolic function is normal.  ·  Left Atrium: Left atrium is moderately dilated.  ·  Aortic Valve: The aortic valve is a trileaflet valve. There is aortic valve sclerosis. There is moderate stenosis. Aortic valve area by VTI is 1.97 cm². Aortic valve peak velocity is 3.00 m/s. Mean gradient is 20 mmHg. The dimensionless index is 0.63.  ·  Mitral Valve: There is no stenosis. The mean pressure gradient across the mitral valve is 4 mmHg at a heart rate of  bpm. There is mild regurgitation.  ·  IVC/SVC: Normal venous pressure at 3 mmHg                               Physical Exam  General: Well nourished and Cooperative    Airway:  Mallampati: III   Mouth Opening: Normal  TM Distance: Normal  Tongue: Normal  Neck ROM: Normal ROM    Dental:  Periodontal disease  Teeth show age related wear & tear  Chest/Lungs:  Clear to auscultation    Heart:  Rhythm: Regular Rhythm        Anesthesia Plan  Type of Anesthesia, risks & benefits  discussed:    Anesthesia Type: Gen Supraglottic Airway  Intra-op Monitoring Plan: Standard ASA Monitors  Post Op Pain Control Plan: multimodal analgesia  Induction:  IV  Informed Consent: Informed consent signed with the Patient and all parties understand the risks and agree with anesthesia plan.  All questions answered.   ASA Score: 3  Day of Surgery Review of History & Physical: H&P Update referred to the surgeon/provider.  Anesthesia Plan Notes: Due to presence of infection L UE a regional block is contraindicated, will require GA/LMA    Ready For Surgery From Anesthesia Perspective.     .  I explained anesthesia plan to patient/responsbile party if available.  Anesthesia consent done going over the material facts, risks, complications & alternatives, obtained which includes the possibility of altering the anesthesia plan.  I reviewed problem list, prior to admission medication list, appropriate labs, any workup, Xray, EKG etc noted below.  Patients condition is satisfactory to proceed with anesthesia plan unless otherwise noted (see anesthesia chart for details of the anesthesia plan carried out).      Pre-operative evaluation for Procedure(s) (LRB):  IRRIGATION AND DEBRIDEMENT, UPPER EXTREMITY (Left)    There were no vitals taken for this visit.    Patient Active Problem List   Diagnosis    Displaced fracture of fifth metatarsal bone, left foot, initial encounter for closed fracture    Recurrent falls    Cellulitis of left hand       Review of patient's allergies indicates:   Allergen Reactions    Iodine      IVP reaction    Shellfish containing products        Current Outpatient Medications   Medication Instructions    atorvastatin (LIPITOR) 10 mg, Oral, Nightly, Dosage unsure.    cholecalciferol (vitamin D3) (VITAMIN D3) 2,000 Units, Oral, Daily    diltiaZEM (TIAZAC) 360 mg, Oral, Daily    furosemide (LASIX) 10 mg/mL (alcohol free) solution Oral, Dosage unsure    meclizine (ANTIVERT) 5 mg, Oral, Every 12  hours PRN    meloxicam (MOBIC) 7.5 mg, Oral, Daily    pantoprazole (PROTONIX) 20 mg, Oral, 2 times daily before meals, Dosage unsure.    sertraline (ZOLOFT) 100 mg, Oral, Nightly, Dosage unsure.    sulfamethoxazole-trimethoprim 800-160mg (BACTRIM DS) 800-160 mg Tab 1 tablet, Oral, 2 times daily    traMADoL (ULTRAM-ER) 50 mg, Oral, Daily       Past Surgical History:   Procedure Laterality Date    ADENOIDECTOMY      APPENDECTOMY       SECTION      EYE SURGERY  ?    FRACTURE SURGERY  ?    HYSTERECTOMY      NEPHRECTOMY      TONSILLECTOMY         Social History     Socioeconomic History    Marital status:    Tobacco Use    Smoking status: Never    Smokeless tobacco: Never   Substance and Sexual Activity    Alcohol use: Never    Drug use: Never    Sexual activity: Never       Lab Results   Component Value Date    WBC 5.40 2024    HGB 11.6 (L) 2024    HCT 35.2 (L) 2024    MCV 87.3 2024     (L) 2024          BMP  Lab Results   Component Value Date    HCT 35.2 (L) 2024     2024    K 4.1 2024    BUN 14.5 2024    CREATININE 0.89 2024    CALCIUM 9.3 2024                Diagnostic Studies:      EKG:  Results for orders placed or performed during the hospital encounter of 24   EKG and show to ED MD    Collection Time: 24  4:39 AM   Result Value Ref Range    QRS Duration 116 ms    [facility] QTC Calculation 477 ms    Narrative    Test Reason : R42,    Vent. Rate : 075 BPM     Atrial Rate : 075 BPM     P-R Int : 168 ms          QRS Dur : 116 ms      QT Int : 428 ms       P-R-T Axes : 058 097 011 degrees     QTc Int : 477 ms    Normal sinus rhythm  Right bundle branch block  Abnormal ECG  Confirmed by Richi VALIENTE, Keshawn ([id number]) on 2024 5:54:01 PM    Referred By: KHANG   SELF           Confirmed By:Keshawn Wright MD

## 2024-06-07 NOTE — NURSING
Nurses Note -- 4 Eyes      6/7/2024   4:57 PM      Skin assessed during: Admit      [x] No Altered Skin Integrity Present    [x]Prevention Measures Documented      [] Yes- Altered Skin Integrity Present or Discovered   [] LDA Added if Not in Epic (Describe Wound)   [] New Altered Skin Integrity was Present on Admit and Documented in LDA   [] Wound Image Taken    Wound Care Consulted? No    Attending Nurse:  Em Tripathi RN/Staff Member:   Livier

## 2024-06-08 LAB
ANION GAP SERPL CALC-SCNC: 12 MEQ/L
BUN SERPL-MCNC: 24.6 MG/DL (ref 9.8–20.1)
CALCIUM SERPL-MCNC: 9.3 MG/DL (ref 8.4–10.2)
CHLORIDE SERPL-SCNC: 106 MMOL/L (ref 98–111)
CO2 SERPL-SCNC: 23 MMOL/L (ref 23–31)
CREAT SERPL-MCNC: 0.96 MG/DL (ref 0.55–1.02)
CREAT/UREA NIT SERPL: 26
CRP SERPL HS-MCNC: 8.84 MG/L
ERYTHROCYTE [DISTWIDTH] IN BLOOD BY AUTOMATED COUNT: 15.7 % (ref 11.5–17)
GFR SERPLBLD CREATININE-BSD FMLA CKD-EPI: 54 ML/MIN/1.73/M2
GLUCOSE SERPL-MCNC: 130 MG/DL (ref 75–121)
GRAM STN SPEC: NORMAL
HCT VFR BLD AUTO: 34 % (ref 37–47)
HGB BLD-MCNC: 10.7 G/DL (ref 12–16)
MAGNESIUM SERPL-MCNC: 2 MG/DL (ref 1.6–2.6)
MCH RBC QN AUTO: 27.6 PG (ref 27–31)
MCHC RBC AUTO-ENTMCNC: 31.5 G/DL (ref 33–36)
MCV RBC AUTO: 87.9 FL (ref 80–94)
NRBC BLD AUTO-RTO: 0 %
PLATELET # BLD AUTO: 127 X10(3)/MCL (ref 130–400)
PLATELETS.RETICULATED NFR BLD AUTO: 6 % (ref 0.9–11.2)
PMV BLD AUTO: 12 FL (ref 7.4–10.4)
POTASSIUM SERPL-SCNC: 4.3 MMOL/L (ref 3.5–5.1)
RBC # BLD AUTO: 3.87 X10(6)/MCL (ref 4.2–5.4)
SODIUM SERPL-SCNC: 141 MMOL/L (ref 136–145)
VANCOMYCIN SERPL-MCNC: 12.2 UG/ML (ref 15–20)
WBC # SPEC AUTO: 2.68 X10(3)/MCL (ref 4.5–11.5)

## 2024-06-08 PROCEDURE — 86141 C-REACTIVE PROTEIN HS: CPT | Performed by: INTERNAL MEDICINE

## 2024-06-08 PROCEDURE — 94761 N-INVAS EAR/PLS OXIMETRY MLT: CPT

## 2024-06-08 PROCEDURE — 80202 ASSAY OF VANCOMYCIN: CPT | Performed by: INTERNAL MEDICINE

## 2024-06-08 PROCEDURE — 63600175 PHARM REV CODE 636 W HCPCS: Performed by: INTERNAL MEDICINE

## 2024-06-08 PROCEDURE — 97162 PT EVAL MOD COMPLEX 30 MIN: CPT

## 2024-06-08 PROCEDURE — 11000001 HC ACUTE MED/SURG PRIVATE ROOM

## 2024-06-08 PROCEDURE — 83735 ASSAY OF MAGNESIUM: CPT | Performed by: INTERNAL MEDICINE

## 2024-06-08 PROCEDURE — 85027 COMPLETE CBC AUTOMATED: CPT | Performed by: INTERNAL MEDICINE

## 2024-06-08 PROCEDURE — 27000221 HC OXYGEN, UP TO 24 HOURS

## 2024-06-08 PROCEDURE — 97166 OT EVAL MOD COMPLEX 45 MIN: CPT

## 2024-06-08 PROCEDURE — 80048 BASIC METABOLIC PNL TOTAL CA: CPT | Performed by: INTERNAL MEDICINE

## 2024-06-08 PROCEDURE — 36415 COLL VENOUS BLD VENIPUNCTURE: CPT | Performed by: INTERNAL MEDICINE

## 2024-06-08 PROCEDURE — 25000003 PHARM REV CODE 250: Performed by: INTERNAL MEDICINE

## 2024-06-08 RX ORDER — ATORVASTATIN CALCIUM 10 MG/1
10 TABLET, FILM COATED ORAL NIGHTLY
Status: DISCONTINUED | OUTPATIENT
Start: 2024-06-08 | End: 2024-06-09 | Stop reason: HOSPADM

## 2024-06-08 RX ORDER — IBUPROFEN 200 MG
200 TABLET ORAL EVERY 6 HOURS PRN
Status: ON HOLD | COMMUNITY
End: 2024-06-09 | Stop reason: HOSPADM

## 2024-06-08 RX ORDER — CHOLECALCIFEROL (VITAMIN D3) 25 MCG
2000 TABLET ORAL DAILY
Status: DISCONTINUED | OUTPATIENT
Start: 2024-06-08 | End: 2024-06-09 | Stop reason: HOSPADM

## 2024-06-08 RX ADMIN — PIPERACILLIN SODIUM AND TAZOBACTAM SODIUM 4.5 G: 4; .5 INJECTION, POWDER, LYOPHILIZED, FOR SOLUTION INTRAVENOUS at 07:06

## 2024-06-08 RX ADMIN — PIPERACILLIN SODIUM AND TAZOBACTAM SODIUM 4.5 G: 4; .5 INJECTION, POWDER, LYOPHILIZED, FOR SOLUTION INTRAVENOUS at 08:06

## 2024-06-08 RX ADMIN — MECLIZINE 12.5 MG: 12.5 TABLET ORAL at 07:06

## 2024-06-08 RX ADMIN — VANCOMYCIN HYDROCHLORIDE 1000 MG: 1 INJECTION, POWDER, LYOPHILIZED, FOR SOLUTION INTRAVENOUS at 06:06

## 2024-06-08 RX ADMIN — PANTOPRAZOLE SODIUM 20 MG: 20 TABLET, DELAYED RELEASE ORAL at 04:06

## 2024-06-08 RX ADMIN — DILTIAZEM HYDROCHLORIDE 360 MG: 180 CAPSULE, COATED, EXTENDED RELEASE ORAL at 08:06

## 2024-06-08 RX ADMIN — MUPIROCIN: 20 OINTMENT TOPICAL at 08:06

## 2024-06-08 RX ADMIN — TRAMADOL HYDROCHLORIDE 25 MG: 50 TABLET ORAL at 02:06

## 2024-06-08 RX ADMIN — CHOLECALCIFEROL TAB 25 MCG (1000 UNIT) 2000 UNITS: 25 TAB at 02:06

## 2024-06-08 RX ADMIN — PANTOPRAZOLE SODIUM 20 MG: 20 TABLET, DELAYED RELEASE ORAL at 06:06

## 2024-06-08 RX ADMIN — SERTRALINE HYDROCHLORIDE 100 MG: 50 TABLET ORAL at 08:06

## 2024-06-08 RX ADMIN — ATORVASTATIN CALCIUM 10 MG: 10 TABLET, FILM COATED ORAL at 08:06

## 2024-06-08 NOTE — PROGRESS NOTES
".[facility] - Orthopaedics  Orthopedics  Progress Note    Patient Name: Yoli Villarreal  MRN: [id number]  Admission Date: 6/7/2024  Hospital Length of Stay: 1 days  Attending Provider: Roberto Roque MD  Primary Care Provider: Nacho Kulkarni MD  Follow-up For: Procedure(s) (LRB):  IRRIGATION AND DEBRIDEMENT, UPPER EXTREMITY (Left)    Post-Operative Day: 1 Day Post-Op  Subjective:     Principal Problem:Cellulitis of left hand    Principal Orthopedic Problem: 1 Day Post-Op     Interval History: Resting comfortably in bed    Review of patient's allergies indicates:   Allergen Reactions    Iodine      IVP reaction    Shellfish containing products        Current Facility-Administered Medications   Medication    0.9%  NaCl infusion    diltiaZEM 24 hr capsule 360 mg    electrolyte-A infusion    HYDROcodone-acetaminophen 5-325 mg per tablet 1 tablet    meclizine tablet 12.5 mg    methocarbamoL tablet 500 mg    metoclopramide injection 10 mg    morphine injection 4 mg    mupirocin 2 % ointment    ondansetron disintegrating tablet 4 mg    ondansetron injection 4 mg    pantoprazole EC tablet 20 mg    piperacillin-tazobactam (ZOSYN) 4.5 g in dextrose 5 % in water (D5W) 100 mL IVPB (MB+)    sertraline tablet 100 mg    sodium chloride 0.9% flush 10 mL    traMADoL tablet 50 mg    vancomycin - pharmacy to dose     Objective:     Vital Signs (Most Recent):  Temp: 99.8 °F (37.7 °C) (06/08/24 0738)  Pulse: 68 (06/08/24 0738)  Resp: 18 (06/07/24 1934)  BP: (!) 144/62 (06/08/24 0738)  SpO2: 97 % (06/08/24 0738) Vital Signs (24h Range):  Temp:  [97.7 °F (36.5 °C)-99.8 °F (37.7 °C)] 99.8 °F (37.7 °C)  Pulse:  [60-81] 68  Resp:  [16-20] 18  SpO2:  [85 %-98 %] 97 %  BP: (128-177)/(43-69) 144/62     Weight: 54.4 kg (119 lb 14.9 oz)  Height: 4' 11.02" (149.9 cm)  Body mass index is 24.21 kg/m².      Intake/Output Summary (Last 24 hours) at 6/8/2024 0815  Last data filed at 6/8/2024 0015  Gross per 24 hour   Intake " 1099.21 ml   Output --   Net 1099.21 ml       Physical Exam:   Musculoskeletal:     Left upper extremity: dressing c/d/i; AIN/PIN/Ulnar motor intact; SILT distally;BCR distally;     Diagnostic Findings:   Significant Labs:   Recent Lab Results  (Last 5 results in the past 72 hours)        06/08/24  0639   06/07/24  1713   06/07/24  1436   06/07/24  1433   06/05/24  1525        Immature Platelet Fraction 6.0               Aerobic Culture - Tissue     No Growth At 24 Hours  [P]   No Growth At 24 Hours  [P]                No Growth At 24 Hours  [P]         Anion Gap 12.0   12.0             Baso #         0.02       Basophil %         0.4       BUN 24.6   18.9             BUN/CREAT RATIO 26   23             Calcium 9.3   9.4             Chloride 106   107             CO2 23   22             Creatinine 0.96   0.83             CRP, High Sensitivity 8.84         20.53       eGFR 54   >60             Eos #         0.02       Eos %         0.4       Glucose 130   114             Hematocrit 34.0         35.2       Hemoglobin 10.7         11.6       Immature Grans (Abs)         0.02       Immature Granulocytes         0.4       Lymph #         1.42       LYMPH %         26.3       Magnesium  2.00               MCH 27.6         28.8       MCHC 31.5         33.0       MCV 87.9         87.3       Mono #         1.61       Mono %         29.8       MPV 12.0         11.9       Neut #         2.31       Neut %         42.7       nRBC 0.0         0.4       Platelet Count 127         117       Potassium 4.3   3.9             RBC 3.87         4.03       RDW 15.7         16.0       Sed Rate         25       Sodium 141   141             WBC 2.68         5.40                               [P] - Preliminary Result                Assessment/Plan:     Active Diagnoses:    Diagnosis Date Noted POA    PRINCIPAL PROBLEM:  Cellulitis of left hand [L03.114] 06/07/2024 Yes      Problems Resolved During this Admission:         PLAN:   6/7/2024: I&D  Left wrist  -continue abx  -dressing change tomorrow  -f/u cultures

## 2024-06-08 NOTE — PLAN OF CARE
Problem: Occupational Therapy  Goal: Occupational Therapy Goal  Description: Pt to perform UBD Touch A by d/c.  Pt to perform LBD Touch A by d/c.  Pt to perform toileting touch A by d/c. MET  Pt to perform toilet transfer Touch A by d/c. MET  Outcome: Progressing

## 2024-06-08 NOTE — PLAN OF CARE
Goals to be met by: 24     Patient will increase functional independence with mobility by performin. Supine to sit with Modified [place]  2. Sit to supine with Modified [place]  3. Sit to stand transfer with Modified [place]  4. Bed to chair transfer with Modified [place] using Rolling Walker  5. Gait  x 150 feet with Modified [place] using Rolling Walker.

## 2024-06-08 NOTE — PT/OT/SLP EVAL
Physical Therapy Evaluation    Patient Name:  Yoli Villarreal   MRN:  [id number]    Recommendations:     Discharge Recommendations: Low Intensity Therapy   Discharge Equipment Recommendations: none   Barriers to discharge: None    Assessment:     Yoli Villarreal is a 89 y.o. female admitted with a medical diagnosis of Cellulitis of left hand.  She presents with the following impairments/functional limitations: weakness, impaired functional mobility, gait instability, impaired balance .    Rehab Prognosis: Good; patient would benefit from acute skilled PT services to address these deficits and reach maximum level of function.    Recent Surgery: Procedure(s) (LRB):  IRRIGATION AND DEBRIDEMENT, UPPER EXTREMITY (Left) 1 Day Post-Op    Plan:     During this hospitalization, patient to be seen daily (QD) to address the identified rehab impairments via therapeutic activities, therapeutic exercises, gait training and progress toward the following goals:    Plan of Care Expires:  06/14/24    Subjective     Chief Complaint: Pain on (L) hand  Patient/Family Comments/goals: to return back to senior care  Pain/Comfort:  Pain Rating 1: 2/10  Location - Side 1: Left  Location 1: hand  Pain Addressed 1: Reposition, Cessation of Activity    Patients cultural, spiritual, Shinto conflicts given the current situation: no    Living Environment:  Assisted Living Facility  Prior to admission, patients level of function was modified independence.  Equipment used at home: bedside commode, rollator.  DME owned (not currently used): none.    Upon discharge, patient will have assistance from caregivers from assisted living facility.    Objective:     Communicated with nursing and OT prior to session.  Patient found supine with peripheral IV  upon PT entry to room.    General Precautions: Standard, fall  Orthopedic Precautions:N/A   Braces: N/A  Respiratory Status: Room air    Exams:  RLE ROM: WFL  RLE Strength: WFL  LLE ROM: WFL  LLE Strength:  WFL    Functional Mobility:  Bed Mobility:     Supine to Sit: supervision  Sit to Supine: supervision  Transfers:     Sit to Stand:  stand by assistance with rolling walker  Bed to Chair: contact guard assistance with  4 wheeled walker  using  Step Transfer  Gait: 70' with CGA using rollator      AM-PAC 6 CLICK MOBILITY  Total Score:        Treatment & Education:  Patient seen for evaluation. Discussed treatment plan with patient and daughter    Patient left supine with all lines intact, call button in reach, and daughter present.    GOALS:   Multidisciplinary Problems       Physical Therapy Goals          Problem: Physical Therapy    Goal Priority Disciplines Outcome Goal Variances Interventions   Physical Therapy Goal     PT, PT/OT      Description: Goals to be met by: 24     Patient will increase functional independence with mobility by performin. Supine to sit with Modified [place]  2. Sit to supine with Modified [place]  3. Sit to stand transfer with Modified [place]  4. Bed to chair transfer with Modified [place] using Rolling Walker  5. Gait  x 150 feet with Modified [place] using Rolling Walker.                          History:     Past Medical History:   Diagnosis Date    Acid reflux     High cholesterol     HTN (hypertension)     Skin cancer     Unspecified macular degeneration        Past Surgical History:   Procedure Laterality Date    ADENOIDECTOMY      APPENDECTOMY       SECTION      EYE SURGERY  ?    FRACTURE SURGERY  ?    HYSTERECTOMY      NEPHRECTOMY      TONSILLECTOMY         Time Tracking:     PT Received On: 24  PT Start Time: 1440     PT Stop Time: 1500  PT Total Time (min): 20 min     Billable Minutes: Evaluation 20      2024

## 2024-06-08 NOTE — PROGRESS NOTES
Pharmacokinetic Assessment Follow Up: IV Vancomycin    Vancomycin serum concentration assessment(s):    The trough level was drawn correctly and can be used to guide therapy at this time. The measurement is within the desired definitive target range of 10 to 20 mcg/mL.    Vancomycin Regimen Plan:    Vancomycin 1 gram q24h.  Check trough at 1830 on 6/10/24.    Drug levels (last 3 results):  Recent Labs   Lab Result Units 06/08/24  1732   Vancomycin Random ug/ml 12.2*        Patient brief summary:  Yoli Villarreal is a 89 y.o. female initiated on antimicrobial therapy with IV Vancomycin for treatment of skin & soft tissue infection      Actual Body Weight:   54.4 kg    Renal Function:   Estimated Creatinine Clearance: 25.2 mL/min (based on SCr of 0.96 mg/dL).,     Dialysis Method (if applicable):  N/A    CBC (last 72 hours):  Recent Labs   Lab Result Units 06/08/24  0639   WBC x10(3)/mcL 2.68*   Hgb g/dL 10.7*   Hct % 34.0*   Platelet x10(3)/mcL 127*       Metabolic Panel (last 72 hours):  Recent Labs   Lab Result Units 06/07/24  1713 06/08/24  0639   Sodium mmol/L 141 141   Potassium mmol/L 3.9 4.3   Chloride mmol/L 107 106   CO2 mmol/L 22* 23   Glucose mg/dL 114 130*   Blood Urea Nitrogen mg/dL 18.9 24.6*   Creatinine mg/dL 0.83 0.96   Magnesium Level mg/dL  --  2.00       Vancomycin Administrations:  vancomycin given in the last 96 hours                     vancomycin 1,250 mg in dextrose 5 % (D5W) 250 mL IVPB (Vial-Mate) (mg) 1,250 mg New Bag 06/07/24 1734    vancomycin injection (g) 1 g Given 06/07/24 1437                    Microbiologic Results:  Microbiology Results (last 7 days)       Procedure Component Value Units Date/Time    Gram Stain [[id number]] Collected: 06/07/24 1433    Order Status: Completed Specimen: Tissue from Wrist, Left Updated: 06/08/24 1114     GRAM STAIN Rare WBC observed      No bacteria seen    Gram Stain [[id number]] Collected: 06/07/24 1436    Order Status: Completed Specimen: Tissue  from Wrist, Left Updated: 06/08/24 1113     GRAM STAIN Few WBC observed      No bacteria seen    Gram Stain [[id number]] Collected: 06/07/24 1433    Order Status: Completed Specimen: Tissue from Wrist, Left Updated: 06/08/24 1112     GRAM STAIN Few WBC observed      No bacteria seen    Tissue Culture - Aerobic [[id number]] Collected: 06/07/24 1433    Order Status: Completed Specimen: Tissue from Wrist, Left Updated: 06/08/24 0634     Tissue - Aerobic Culture No Growth At 24 Hours    Tissue Culture - Aerobic [[id number]] Collected: 06/07/24 1433    Order Status: Completed Specimen: Tissue from Wrist, Left Updated: 06/08/24 0633     Tissue - Aerobic Culture No Growth At 24 Hours    Tissue Culture - Aerobic [[id number]] Collected: 06/07/24 1436    Order Status: Completed Specimen: Tissue from Wrist, Left Updated: 06/08/24 0632     Tissue - Aerobic Culture No Growth At 24 Hours    Anaerobic Culture [[id number]] Collected: 06/07/24 1436    Order Status: Sent Specimen: Tissue from Wrist, Left Updated: 06/07/24 1534    Fungal Culture [[id number]] Collected: 06/07/24 1436    Order Status: Sent Specimen: Tissue from Wrist, Left Updated: 06/07/24 1534    Anaerobic Culture [[id number]] Collected: 06/07/24 1433    Order Status: Sent Specimen: Tissue from Wrist, Left Updated: 06/07/24 1534    Fungal Culture [[id number]] Collected: 06/07/24 1433    Order Status: Sent Specimen: Tissue from Wrist, Left Updated: 06/07/24 1534    Anaerobic Culture [[id number]] Collected: 06/07/24 1433    Order Status: Sent Specimen: Tissue from Wrist, Left Updated: 06/07/24 1534    Fungal Culture [[id number]] Collected: 06/07/24 1433    Order Status: Sent Specimen: Tissue from Wrist, Left Updated: 06/07/24 1534    Gram Stain [[id number]]     Order Status: Sent Specimen: Tissue from Wrist, Left     Anaerobic Culture [[id number]]     Order Status: Sent Specimen: Tissue from Wrist, Left     Tissue Culture - Aerobic [[id number]]     Order Status: Sent  Specimen: Tissue from Wrist, Left     Fungal Culture [[id number]]     Order Status: Sent Specimen: Tissue from Wrist, Left     Gram Stain [[id number]]     Order Status: Sent Specimen: SWAB from Wrist, Left     Anaerobic Culture [[id number]]     Order Status: Sent Specimen: SWAB from Wrist, Left     Tissue Culture - Aerobic [[id number]]     Order Status: Sent Specimen: Tissue from Wrist, Left     Fungal Culture [[id number]]     Order Status: Sent Specimen: SWAB from Wrist, Left     Gram Stain [[id number]] Collected: 06/07/24 1446    Order Status: Canceled Specimen: SWAB from Wrist, Left     Anaerobic Culture [[id number]] Collected: 06/07/24 1446    Order Status: Canceled Specimen: SWAB from Wrist, Left     Tissue Culture - Aerobic [[id number]] Collected: 06/07/24 1446    Order Status: Canceled Specimen: Tissue from Wrist, Left     Fungal Culture [[id number]] Collected: 06/07/24 1446    Order Status: Canceled Specimen: SWAB from Wrist, Left

## 2024-06-08 NOTE — PT/OT/SLP EVAL
Occupational Therapy   Evaluation    Name: Yoli Villarreal  MRN: [id number]  Admitting Diagnosis: Cellulitis of left hand  Recent Surgery: Procedure(s) (LRB):  IRRIGATION AND DEBRIDEMENT, UPPER EXTREMITY (Left) 1 Day Post-Op    Recommendations:     Discharge Recommendations: Low Intensity Therapy  Discharge Equipment Recommendations:  none  Barriers to discharge:  None    Assessment:     Yoli Villarreal is a 89 y.o. female with a medical diagnosis of Cellulitis of left hand. Performance deficits affecting function: weakness, gait instability, impaired functional mobility, decreased upper extremity function, impaired skin, impaired fine motor, impaired coordination, pain.      Rehab Prognosis: Good; patient would benefit from acute skilled OT services to address these deficits and reach maximum level of function.       Plan:     Patient to be seen daily (QD) to address the above listed problems via self-care/home management, therapeutic activities, therapeutic exercises  Plan of Care Expires: 06/14/24  Plan of Care Reviewed with: patient, daughter    Subjective     Chief Complaint: pain in L hand with movement  Patient/Family Comments/goals: to get better    Occupational Profile:  Living Environment: [facility] at [facility] has WIS  Previous level of function: min-mod with bathing and dressing, not responsible for IADLs  Equipment Used at Home: bedside commode, rollator, grab bar, shower chair  Assistance upon Discharge: facility staff    Pain/Comfort:  Pain Rating 1: 2/10  Location - Side 1: Left  Location - Orientation 1: generalized  Location 1: hand  Pain Addressed 1: Distraction, Reposition, Nurse notified    Patients cultural, spiritual, Latter day conflicts given the current situation: no    Objective:     Communicated with: NS prior to session.  Patient found  HOB elevated and daughter present  with peripheral IV upon OT entry to room.    General Precautions: Standard, fall  Orthopedic  Precautions: N/A  Braces: N/A  Respiratory Status: Room air    Occupational Performance:    Bed Mobility:    Patient completed Rolling/Turning to Left with  supervision  Patient completed Rolling/Turning to Right with supervision  Patient completed Scooting/Bridging with supervision  Patient completed Supine to Sit with supervision  Patient completed Sit to Supine with supervision    Functional Mobility/Transfers:  Patient completed Sit <> Stand Transfer with contact guard assistance  with  rollator   Patient completed Toilet Transfer Step Transfer technique with contact guard assistance with  rollator  Functional Mobility: Patient performed FM in hallway with CGA and rollator, tolerated ~120 feet with no LOBs or rest breaks needed. Slower but steady pace.    Activities of Daily Living:  Toileting: contact guard assistance for safety with standing balance while pulling underwear up, void +urine    Cognitive/Visual Perceptual:  Cognitive/Psychosocial Skills:     -       Oriented to: Person, Place, Time, and Situation   -       Follows Commands/attention:Follows multistep  commands  -       Communication: clear/fluent  -       Memory: No Deficits noted  -       Safety awareness/insight to disability: intact   -       Mood/Affect/Coping skills/emotional control: Appropriate to situation, Cooperative, and Pleasant  Visual/Perceptual:      -pt with macular degeneration      Physical Exam:  Motor Planning: -       intact  Dominant hand: -       right  Upper Extremity Range of Motion:     -       Right Upper Extremity: WFL  -       Left Upper Extremity: WFL  Upper Extremity Strength:    -       Right Upper Extremity: WFL  -       Left Upper Extremity: WFL    Treatment & Education:  Pt and daughter educated on roles and goals of occupational therapy, POC for acute stay.     Patient left HOB elevated with all lines intact, call button in reach, THEO Bocanegra notified, and daughter present    GOALS:   Multidisciplinary Problems        Occupational Therapy Goals          Problem: Occupational Therapy    Goal Priority Disciplines Outcome Interventions   Occupational Therapy Goal     OT, PT/OT Progressing    Description: Pt to perform UBD Touch A by d/c.  Pt to perform LBD Touch A by d/c.  Pt to perform toileting touch A by d/c. MET  Pt to perform toilet transfer Touch A by d/c. MET                       History:     Past Medical History:   Diagnosis Date    Acid reflux     High cholesterol     HTN (hypertension)     Skin cancer     Unspecified macular degeneration          Past Surgical History:   Procedure Laterality Date    ADENOIDECTOMY      APPENDECTOMY       SECTION      EYE SURGERY  ?    FRACTURE SURGERY  ?    HYSTERECTOMY      NEPHRECTOMY      TONSILLECTOMY         Time Tracking:     OT Date of Treatment: 24  OT Start Time: 1331  OT Stop Time: 1401  OT Total Time (min): 30 min    Billable Minutes:Evaluation 30    2024

## 2024-06-08 NOTE — PROGRESS NOTES
"Pharmacokinetic Initial Assessment: IV Vancomycin    Assessment/Plan:    Initiate intravenous vancomycin with loading dose of 1250 mg once with subsequent doses when random concentrations are less than 20 mcg/mL  Desired empiric serum trough concentration is 10 to 20 mcg/mL  Draw vancomycin random level on 06/08 at 1800.  Pharmacy will continue to follow and monitor vancomycin.           Patient brief summary:  Yoli Villarreal is a 89 y.o. female initiated on antimicrobial therapy with IV Vancomycin for treatment of suspected skin & soft tissue infection    Actual Body Weight:   54.4 kg    Renal Function:   Estimated Creatinine Clearance: 29.2 mL/min (based on SCr of 0.83 mg/dL).,     Dialysis Method (if applicable):  N/A    CBC (last 72 hours):  Recent Labs   Lab Result Units 06/05/24  1525   WBC x10(3)/mcL 5.40   Hgb g/dL 11.6*   Hct % 35.2*   Platelet x10(3)/mcL 117*   Mono % % 29.8   Eos % % 0.4   Basophil % % 0.4       Metabolic Panel (last 72 hours):  Recent Labs   Lab Result Units 06/07/24  1713   Sodium mmol/L 141   Potassium mmol/L 3.9   Chloride mmol/L 107   CO2 mmol/L 22*   Glucose mg/dL 114   Blood Urea Nitrogen mg/dL 18.9   Creatinine mg/dL 0.83       Drug levels (last 3 results):  No results for input(s): "VANCOMYCINRA", "VANCORANDOM", "VANCOMYCINPE", "VANCOPEAK", "VANCOMYCINTR", "VANCOTROUGH" in the last 72 hours.    Microbiologic Results:  Microbiology Results (last 7 days)       Procedure Component Value Units Date/Time    Anaerobic Culture [[id number]] Collected: 06/07/24 1436    Order Status: Sent Specimen: Tissue from Wrist, Left Updated: 06/07/24 1534    Gram Stain [[id number]] Collected: 06/07/24 1436    Order Status: Sent Specimen: Tissue from Wrist, Left Updated: 06/07/24 1534    Tissue Culture - Aerobic [[id number]] Collected: 06/07/24 1436    Order Status: Sent Specimen: Tissue from Wrist, Left Updated: 06/07/24 1534    Fungal Culture [[id number]] Collected: 06/07/24 1436    Order Status: " Sent Specimen: Tissue from Wrist, Left Updated: 06/07/24 1534    Gram Stain [[id number]] Collected: 06/07/24 1433    Order Status: Sent Specimen: Tissue from Wrist, Left Updated: 06/07/24 1534    Anaerobic Culture [[id number]] Collected: 06/07/24 1433    Order Status: Sent Specimen: Tissue from Wrist, Left Updated: 06/07/24 1534    Fungal Culture [[id number]] Collected: 06/07/24 1433    Order Status: Sent Specimen: Tissue from Wrist, Left Updated: 06/07/24 1534    Tissue Culture - Aerobic [[id number]] Collected: 06/07/24 1433    Order Status: Sent Specimen: Tissue from Wrist, Left Updated: 06/07/24 1534    Anaerobic Culture [[id number]] Collected: 06/07/24 1433    Order Status: Sent Specimen: Tissue from Wrist, Left Updated: 06/07/24 1534    Gram Stain [[id number]] Collected: 06/07/24 1433    Order Status: Sent Specimen: Tissue from Wrist, Left Updated: 06/07/24 1534    Tissue Culture - Aerobic [[id number]] Collected: 06/07/24 1433    Order Status: Sent Specimen: Tissue from Wrist, Left Updated: 06/07/24 1534    Fungal Culture [[id number]] Collected: 06/07/24 1433    Order Status: Sent Specimen: Tissue from Wrist, Left Updated: 06/07/24 1534    Gram Stain [[id number]]     Order Status: Sent Specimen: Tissue from Wrist, Left     Anaerobic Culture [[id number]]     Order Status: Sent Specimen: Tissue from Wrist, Left     Tissue Culture - Aerobic [[id number]]     Order Status: Sent Specimen: Tissue from Wrist, Left     Fungal Culture [[id number]]     Order Status: Sent Specimen: Tissue from Wrist, Left     Gram Stain [[id number]]     Order Status: Sent Specimen: SWAB from Wrist, Left     Anaerobic Culture [[id number]]     Order Status: Sent Specimen: SWAB from Wrist, Left     Tissue Culture - Aerobic [[id number]]     Order Status: Sent Specimen: Tissue from Wrist, Left     Fungal Culture [[id number]]     Order Status: Sent Specimen: SWAB from Wrist, Left     Gram Stain [[id number]] Collected: 06/07/24 1446    Order  Status: Canceled Specimen: SWAB from Wrist, Left     Anaerobic Culture [[id number]] Collected: 06/07/24 1446    Order Status: Canceled Specimen: SWAB from Wrist, Left     Tissue Culture - Aerobic [[id number]] Collected: 06/07/24 1446    Order Status: Canceled Specimen: Tissue from Wrist, Left     Fungal Culture [[id number]] Collected: 06/07/24 1446    Order Status: Canceled Specimen: SWAB from Wrist, Left

## 2024-06-08 NOTE — HPI
Yoli Villarreal is a 89 y.o. female who  has a past medical history of Acid reflux, High cholesterol, HTN (hypertension), Skin cancer, and Unspecified macular degeneration.. The patient presented to [facility] on 6/7/2024 with a primary complaint of left hand pain due to a suspected infection. She was evaluated by Dr. Bolaños in clinic today. Her pain was worsening and she had redness tracking up her forearm so he took her to the OR for a washout. I was asked to admit the patient for ongoing monitoring. She doesn't recall an inciting event that caused the infection. Denies history of cellulitis or skin abscess. Her daughter is at the bedside.

## 2024-06-09 VITALS
HEIGHT: 59 IN | DIASTOLIC BLOOD PRESSURE: 60 MMHG | OXYGEN SATURATION: 93 % | TEMPERATURE: 98 F | RESPIRATION RATE: 18 BRPM | WEIGHT: 119.94 LBS | BODY MASS INDEX: 24.18 KG/M2 | SYSTOLIC BLOOD PRESSURE: 156 MMHG | HEART RATE: 54 BPM

## 2024-06-09 LAB
ANION GAP SERPL CALC-SCNC: 7 MEQ/L
BUN SERPL-MCNC: 28.2 MG/DL (ref 9.8–20.1)
CALCIUM SERPL-MCNC: 9.8 MG/DL (ref 8.4–10.2)
CHLORIDE SERPL-SCNC: 108 MMOL/L (ref 98–111)
CO2 SERPL-SCNC: 28 MMOL/L (ref 23–31)
CREAT SERPL-MCNC: 0.9 MG/DL (ref 0.55–1.02)
CREAT/UREA NIT SERPL: 31
CRP SERPL HS-MCNC: 4.21 MG/L
GFR SERPLBLD CREATININE-BSD FMLA CKD-EPI: 58 ML/MIN/1.73/M2
GLUCOSE SERPL-MCNC: 110 MG/DL (ref 75–121)
MAGNESIUM SERPL-MCNC: 2.1 MG/DL (ref 1.6–2.6)
POTASSIUM SERPL-SCNC: 4.4 MMOL/L (ref 3.5–5.1)
SODIUM SERPL-SCNC: 143 MMOL/L (ref 136–145)

## 2024-06-09 PROCEDURE — 83735 ASSAY OF MAGNESIUM: CPT | Performed by: INTERNAL MEDICINE

## 2024-06-09 PROCEDURE — 97535 SELF CARE MNGMENT TRAINING: CPT

## 2024-06-09 PROCEDURE — 36415 COLL VENOUS BLD VENIPUNCTURE: CPT | Performed by: INTERNAL MEDICINE

## 2024-06-09 PROCEDURE — 86141 C-REACTIVE PROTEIN HS: CPT | Performed by: INTERNAL MEDICINE

## 2024-06-09 PROCEDURE — 63600175 PHARM REV CODE 636 W HCPCS: Performed by: INTERNAL MEDICINE

## 2024-06-09 PROCEDURE — 25000003 PHARM REV CODE 250: Performed by: INTERNAL MEDICINE

## 2024-06-09 PROCEDURE — 80048 BASIC METABOLIC PNL TOTAL CA: CPT | Performed by: INTERNAL MEDICINE

## 2024-06-09 RX ORDER — TRAMADOL HYDROCHLORIDE 50 MG/1
50 TABLET ORAL EVERY 8 HOURS PRN
Qty: 21 TABLET | Refills: 0 | Status: SHIPPED | OUTPATIENT
Start: 2024-06-09 | End: 2024-06-16

## 2024-06-09 RX ORDER — CLINDAMYCIN HYDROCHLORIDE 300 MG/1
300 CAPSULE ORAL EVERY 8 HOURS
Qty: 24 CAPSULE | Refills: 0 | Status: SHIPPED | OUTPATIENT
Start: 2024-06-09 | End: 2024-06-17

## 2024-06-09 RX ADMIN — PANTOPRAZOLE SODIUM 20 MG: 20 TABLET, DELAYED RELEASE ORAL at 05:06

## 2024-06-09 RX ADMIN — PIPERACILLIN SODIUM AND TAZOBACTAM SODIUM 4.5 G: 4; .5 INJECTION, POWDER, LYOPHILIZED, FOR SOLUTION INTRAVENOUS at 07:06

## 2024-06-09 RX ADMIN — MUPIROCIN: 20 OINTMENT TOPICAL at 08:06

## 2024-06-09 RX ADMIN — TRAMADOL HYDROCHLORIDE 50 MG: 50 TABLET ORAL at 09:06

## 2024-06-09 RX ADMIN — CHOLECALCIFEROL TAB 25 MCG (1000 UNIT) 2000 UNITS: 25 TAB at 08:06

## 2024-06-09 RX ADMIN — DILTIAZEM HYDROCHLORIDE 360 MG: 180 CAPSULE, COATED, EXTENDED RELEASE ORAL at 08:06

## 2024-06-09 NOTE — DISCHARGE SUMMARY
"[facility] ORTHOPAEDIC   [facility] Medicine Discharge Summary    Admit Date: 6/7/2024  Discharge Date and Time: 6/9/20241:51 PM  Admitting Physician: [unfilled]  Discharging Physician: Roberto Roque MD.  Primary Care Physician: Nacho Kulkarni MD  Consults (From admission, onward)          Status Ordering Provider     Inpatient consult to Social Work/Case Management  Once        Provider:  (Not yet assigned)    Acknowledged ROBERTO ROQUE     Pharmacy to dose Vancomycin consult  Once        Provider:  (Not yet assigned)   Placed in "And" Linked Group    Acknowledged ROBERTO ROQUE                 Discharge Diagnoses:  Left wrist cellulitis  HTN  HLD  GERD      HPI  Yoli Villarreal is a 89 y.o. female who  has a past medical history of Acid reflux, High cholesterol, HTN (hypertension), Skin cancer, and Unspecified macular degeneration.. The patient presented to [facility] on 6/7/2024 with a primary complaint of left hand pain due to a suspected infection. She was evaluated by Dr. Bolaños in clinic today. Her pain was worsening and she had redness tracking up her forearm so he took her to the OR for a washout. I was asked to admit the patient for ongoing monitoring. She doesn't recall an inciting event that caused the infection. Denies history of cellulitis or skin abscess. Her daughter is at the bedside.       Hospital Course:   The pt's postop course has been uneventful. Her wound cultures have remained negative. She is stable for dc home to complete a course of oral abx and f/u with Dr. Bolaños in clinic. The pt and her daughter agree with this plan.      Pt was seen and examined on the day of discharge.    Vitals:  VITAL SIGNS: 24 HRS MIN & MAX LAST   Temp  Min: 97.7 °F (36.5 °C)  Max: 99 °F (37.2 °C) 97.7 °F (36.5 °C)   BP  Min: 132/62  Max: 174/54 (!) 156/60   Pulse  Min: 54  Max: 75  (!) 54   Resp  Min: 18  Max: 18 18   SpO2  Min: 93 %  Max: 98 % (!) 93 %       Physical " Exam:  General: In no acute distress, afebrile  Chest: Clear to auscultation bilaterally  Heart: RRR, +S1, S2, no appreciable murmur  Abdomen: Soft, nontender, BS +  MSK: Left wrist, hand bandaged. Warm, no lower extremity edema, no clubbing or cyanosis  Neurologic: Alert and oriented x4  Psych/mental status: Appropriate mood and affect. Responds appropriately to questions.       Procedures Performed:   IRRIGATION AND DEBRIDEMENT, UPPER EXTREMITY  TX INCISION/DRAINAGE, SKIN [[id number]] on 6/7/24    Significant Diagnostic Studies: See Full reports for all details    Recent Labs   Lab 06/05/24  1525 06/08/24  0639   WBC 5.40 2.68*   RBC 4.03* 3.87*   HGB 11.6* 10.7*   HCT 35.2* 34.0*   MCV 87.3 87.9   MCH 28.8 27.6   MCHC 33.0 31.5*   RDW 16.0 15.7   * 127*   MPV 11.9* 12.0*       Recent Labs   Lab 06/07/24  1713 06/08/24  0639 06/09/24  0636    141 143   K 3.9 4.3 4.4    106 108   CO2 22* 23 28   BUN 18.9 24.6* 28.2*   CREATININE 0.83 0.96 0.90   CALCIUM 9.4 9.3 9.8   MG  --  2.00 2.10        Microbiology Results (last 7 days)       Procedure Component Value Units Date/Time    Anaerobic Culture [[id number]] Collected: 06/07/24 1433    Order Status: Completed Specimen: Tissue from Wrist, Left Updated: 06/09/24 0708     Anaerobe Culture No Anaerobes Isolated    Anaerobic Culture [[id number]] Collected: 06/07/24 1433    Order Status: Completed Specimen: Tissue from Wrist, Left Updated: 06/09/24 0707     Anaerobe Culture No Anaerobes Isolated    Anaerobic Culture [[id number]] Collected: 06/07/24 1436    Order Status: Completed Specimen: Tissue from Wrist, Left Updated: 06/09/24 0706     Anaerobe Culture No Anaerobes Isolated    Tissue Culture - Aerobic [[id number]] Collected: 06/07/24 1433    Order Status: Completed Specimen: Tissue from Wrist, Left Updated: 06/09/24 0658     Tissue - Aerobic Culture No Growth At 48 Hours    Tissue Culture - Aerobic [[id number]] Collected: 06/07/24 1433    Order Status:  Completed Specimen: Tissue from Wrist, Left Updated: 06/09/24 0657     Tissue - Aerobic Culture No Growth At 48 Hours    Tissue Culture - Aerobic [[id number]] Collected: 06/07/24 1436    Order Status: Completed Specimen: Tissue from Wrist, Left Updated: 06/09/24 0657     Tissue - Aerobic Culture No Growth At 48 Hours    Gram Stain [[id number]] Collected: 06/07/24 1433    Order Status: Completed Specimen: Tissue from Wrist, Left Updated: 06/08/24 1114     GRAM STAIN Rare WBC observed      No bacteria seen    Gram Stain [[id number]] Collected: 06/07/24 1436    Order Status: Completed Specimen: Tissue from Wrist, Left Updated: 06/08/24 1113     GRAM STAIN Few WBC observed      No bacteria seen    Gram Stain [[id number]] Collected: 06/07/24 1433    Order Status: Completed Specimen: Tissue from Wrist, Left Updated: 06/08/24 1112     GRAM STAIN Few WBC observed      No bacteria seen    Fungal Culture [[id number]] Collected: 06/07/24 1436    Order Status: Sent Specimen: Tissue from Wrist, Left Updated: 06/07/24 1534    Fungal Culture [[id number]] Collected: 06/07/24 1433    Order Status: Sent Specimen: Tissue from Wrist, Left Updated: 06/07/24 1534    Fungal Culture [[id number]] Collected: 06/07/24 1433    Order Status: Sent Specimen: Tissue from Wrist, Left Updated: 06/07/24 1534    Gram Stain [[id number]]     Order Status: Sent Specimen: Tissue from Wrist, Left     Anaerobic Culture [[id number]]     Order Status: Sent Specimen: Tissue from Wrist, Left     Tissue Culture - Aerobic [[id number]]     Order Status: Sent Specimen: Tissue from Wrist, Left     Fungal Culture [[id number]]     Order Status: Sent Specimen: Tissue from Wrist, Left     Gram Stain [[id number]]     Order Status: Sent Specimen: SWAB from Wrist, Left     Anaerobic Culture [[id number]]     Order Status: Sent Specimen: SWAB from Wrist, Left     Tissue Culture - Aerobic [[id number]]     Order Status: Sent Specimen: Tissue from Wrist, Left     Fungal Culture  [[id number]]     Order Status: Sent Specimen: SWAB from Wrist, Left     Gram Stain [[id number]] Collected: 06/07/24 1446    Order Status: Canceled Specimen: SWAB from Wrist, Left     Anaerobic Culture [[id number]] Collected: 06/07/24 1446    Order Status: Canceled Specimen: SWAB from Wrist, Left     Tissue Culture - Aerobic [[id number]] Collected: 06/07/24 1446    Order Status: Canceled Specimen: Tissue from Wrist, Left     Fungal Culture [[id number]] Collected: 06/07/24 1446    Order Status: Canceled Specimen: SWAB from Wrist, Left              MRI Forearm Without Contrast Left  Narrative: EXAMINATION:  MRI WRIST WITHOUT CONTRAST LEFT; MRI FOREARM WITHOUT CONTRAST LEFT    CLINICAL HISTORY:  89-year-old woman with left hand/wrist pain and suspected soft tissue infection.    TECHNIQUE:  Axial PD, axial T2 fat sat, coronal T2 fat sat, coronal T1, sagittal T1, and coronal T2 3D GRE non-contrast 1.5 T magnetic resonance imaging was performed through the left wrist.  Additional sagittal STIR, sagittal T1, axial PD, axial T2 fat sat, coronal T1, and coronal STIR non-contrast 1.5T magnetic resonance imaging was performed through the left forearm.    COMPARISON:  None.  Correlation is made with left hand radiographs of 06/05/2024.    FINDINGS:  There is prominent soft tissue swelling and diffuse subcutaneous edema along the visualized right thumb and radial hand/wrist along with additional more mild diffuse soft tissue swelling and subcutaneous edema at the dorsal hand/wrist and volar distal forearm and wrist.  There is no abscess, focal fluid collection, or hematoma.  There are no findings of osteomyelitis.    There is severe degenerative joint space narrowing with thin subchondral degenerative marrow change and marked marginal osteophyte formation at the thumb CMC articulation.  There is no acute joint space abnormality such as septic arthritis.  There is no significant abnormality at the radiocarpal or distal radioulnar  joints.  The triangular fibrocartilage complex demonstrates mild diffuse degenerative change without tear.  The extensor tendon compartments are intact and with no significant abnormality.  The flexor tendons and median nerve through the carpal tunnel are also intact and with no significant abnormality.  There is no space-occupying lesion such as ganglion cyst at the wrist and no solid mass.  There is no muscle edema or atrophy.    The left forearm imaging is degraded by persistent patient motion.  The subcutaneous edema and mild soft tissue swelling at the volar wrist extends proximally to the mid forearm.  There is no additional pathology along the left forearm.  The radius and ulna are intact and there are no findings of osteomyelitis at the forearm or elbow.  No acute pathology is identified at the elbow.  Impression: Diffuse soft tissue swelling and edema at the left thumb and radial wrist more advanced than dorsal hand and volar wrist and distal forearm.  No abscess.  No osteomyelitis or septic arthritis.    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    No additional acute pathology identified at the left wrist or forearm.    Electronically signed by: Teofilo Valenzuela  Date:    06/06/2024  Time:    10:26  MRI Wrist Joint Without Contrast Left  Narrative: EXAMINATION:  MRI WRIST WITHOUT CONTRAST LEFT; MRI FOREARM WITHOUT CONTRAST LEFT    CLINICAL HISTORY:  89-year-old woman with left hand/wrist pain and suspected soft tissue infection.    TECHNIQUE:  Axial PD, axial T2 fat sat, coronal T2 fat sat, coronal T1, sagittal T1, and coronal T2 3D GRE non-contrast 1.5 T magnetic resonance imaging was performed through the left wrist.  Additional sagittal STIR, sagittal T1, axial PD, axial T2 fat sat, coronal T1, and coronal STIR non-contrast 1.5T magnetic resonance imaging was performed through the left forearm.    COMPARISON:  None.  Correlation is made with left hand radiographs of  06/05/2024.    FINDINGS:  There is prominent soft tissue swelling and diffuse subcutaneous edema along the visualized right thumb and radial hand/wrist along with additional more mild diffuse soft tissue swelling and subcutaneous edema at the dorsal hand/wrist and volar distal forearm and wrist.  There is no abscess, focal fluid collection, or hematoma.  There are no findings of osteomyelitis.    There is severe degenerative joint space narrowing with thin subchondral degenerative marrow change and marked marginal osteophyte formation at the thumb CMC articulation.  There is no acute joint space abnormality such as septic arthritis.  There is no significant abnormality at the radiocarpal or distal radioulnar joints.  The triangular fibrocartilage complex demonstrates mild diffuse degenerative change without tear.  The extensor tendon compartments are intact and with no significant abnormality.  The flexor tendons and median nerve through the carpal tunnel are also intact and with no significant abnormality.  There is no space-occupying lesion such as ganglion cyst at the wrist and no solid mass.  There is no muscle edema or atrophy.    The left forearm imaging is degraded by persistent patient motion.  The subcutaneous edema and mild soft tissue swelling at the volar wrist extends proximally to the mid forearm.  There is no additional pathology along the left forearm.  The radius and ulna are intact and there are no findings of osteomyelitis at the forearm or elbow.  No acute pathology is identified at the elbow.  Impression: Diffuse soft tissue swelling and edema at the left thumb and radial wrist more advanced than dorsal hand and volar wrist and distal forearm.  No abscess.  No osteomyelitis or septic arthritis.    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    No additional acute pathology identified at the left wrist or forearm.    Electronically signed by: Teofilo  Nicolas  Date:    06/06/2024  Time:    10:26         Medication List        START taking these medications      clindamycin 300 MG capsule  Commonly known as: CLEOCIN  Take 1 capsule (300 mg total) by mouth every 8 (eight) hours. for 8 days     traMADoL 50 mg tablet  Commonly known as: ULTRAM  Take 1 tablet (50 mg total) by mouth every 8 (eight) hours as needed for Pain.  Replaces: traMADoL 100 MG Tb24            CONTINUE taking these medications      atorvastatin 10 MG tablet  Commonly known as: LIPITOR     cholecalciferol (vitamin D3) 50 mcg (2,000 unit) Tab  Commonly known as: VITAMIN D3  Take 1 tablet (2,000 Units total) by mouth once daily.     diltiaZEM 360 MG Cs24  Commonly known as: TIAZAC     furosemide 10 mg/mL (alcohol free) solution  Commonly known as: LASIX     meclizine 50 MG tablet  Commonly known as: ANTIVERT     meloxicam 7.5 MG tablet  Commonly known as: MOBIC     pantoprazole 20 MG tablet  Commonly known as: PROTONIX     sertraline 100 MG tablet  Commonly known as: ZOLOFT            STOP taking these medications      ibuprofen 200 MG tablet  Commonly known as: ADVIL,MOTRIN     sulfamethoxazole-trimethoprim 800-160mg 800-160 mg Tab  Commonly known as: BACTRIM DS     traMADoL 100 MG Tb24  Commonly known as: ULTRAM-ER  Replaced by: traMADoL 50 mg tablet               Where to Get Your Medications        These medications were sent to [id number] [address] AT [address] [address]      Phone: [phone]   clindamycin 300 MG capsule  traMADoL 50 mg tablet          Explained in detail to the patient the discharge plan, medications, and follow-up visits. Pt understands and agrees with the treatment plan.  [place] [facility] [place] Condition: stable  Diet-   Dietary Orders (From admission, onward)       Start     Ordered    06/07/24 1650  Dietary nutrition supplements Boost  Plus Nutritional Drink - Very Vanilla; TID  Continuous        Question Answer Comment   Select PO Supplement: Boost Plus Nutritional Drink - Very Vanilla    Frequency: TID        06/07/24 1651    06/07/24 1608  Diet Soft & Bite Sized (IDDSI Level 6)  Diet effective now         06/07/24 1607                   Medications Per DC med rec  Activities as tolerated   Follow-up Information       Miquel Bolaños MD. Go on 6/19/2024.    Specialties: Hand Surgery, Orthopedic Surgery  Contact information:  [address] [facility] [address]  [facility] [address] [phone]                           For further questions contact hospitalist office.    Discharge time >30 minutes    For worsening symptoms, chest pain, shortness of breath, increased abdominal pain, high grade fever, stroke or stroke like symptoms, immediately go to the nearest Emergency Room or call 911 as soon as possible.      Roberto Cardenas M.D, on 6/9/2024. at 1:51 PM.

## 2024-06-09 NOTE — PT/OT/SLP PROGRESS
Physical Therapy      Patient Name:  Yoli Villarreal   MRN:  [id number]    Patient not seen today secondary to Other (Comment) (Patient 's daughter requested not to work with patient, stated that patient is in pain and did not sleep well last night. Patient in her chair sleeping.).

## 2024-06-09 NOTE — PROGRESS NOTES
".[facility] - Orthopaedics  Orthopedics  Progress Note    Patient Name: Yoli Villarreal  MRN: [id number]  Admission Date: 6/7/2024  Hospital Length of Stay: 2 days  Attending Provider: Roberto Roque MD  Primary Care Provider: Nacho Kulkarni MD  Follow-up For: Procedure(s) (LRB):  IRRIGATION AND DEBRIDEMENT, UPPER EXTREMITY (Left)    Post-Operative Day: 2 Day Post-Op  Subjective:     Principal Problem:Cellulitis of left hand    Principal Orthopedic Problem: 2 Days Post-Op     Interval History: Resting comfortably in bed    Review of patient's allergies indicates:   Allergen Reactions    Iodine      IVP reaction    Shellfish containing products        Current Facility-Administered Medications   Medication    atorvastatin tablet 10 mg    diltiaZEM 24 hr capsule 360 mg    electrolyte-A infusion    HYDROcodone-acetaminophen 5-325 mg per tablet 1 tablet    meclizine tablet 12.5 mg    methocarbamoL tablet 500 mg    metoclopramide injection 10 mg    morphine injection 4 mg    mupirocin 2 % ointment    ondansetron disintegrating tablet 4 mg    ondansetron injection 4 mg    pantoprazole EC tablet 20 mg    piperacillin-tazobactam (ZOSYN) 4.5 g in dextrose 5 % in water (D5W) 100 mL IVPB (MB+)    sertraline tablet 100 mg    sodium chloride 0.9% flush 10 mL    traMADoL tablet 50 mg    vancomycin (VANCOCIN) 1,000 mg in dextrose 5 % (D5W) 250 mL IVPB (Vial-Mate)    vancomycin - pharmacy to dose    vitamin D 1000 units tablet 2,000 Units     Objective:     Vital Signs (Most Recent):  Temp: 98.9 °F (37.2 °C) (06/09/24 0723)  Pulse: (!) 58 (06/09/24 0806)  Resp: 18 (06/09/24 0002)  BP: (!) 174/54 (06/09/24 0806)  SpO2: (!) 94 % (06/09/24 0723) Vital Signs (24h Range):  Temp:  [97.7 °F (36.5 °C)-99 °F (37.2 °C)] 98.9 °F (37.2 °C)  Pulse:  [56-75] 58  Resp:  [18] 18  SpO2:  [94 %-98 %] 94 %  BP: (132-174)/(47-67) 174/54     Weight: 54.4 kg (119 lb 14.9 oz)  Height: 4' 11.02" (149.9 cm)  Body mass index is 24.21 " kg/m².      Intake/Output Summary (Last 24 hours) at 6/9/2024 0911  Last data filed at 6/9/2024 0712  Gross per 24 hour   Intake 240 ml   Output 400 ml   Net -160 ml       Physical Exam:   Musculoskeletal:     Left upper extremity: incision c/d/i; AIN/PIN/Ulnar motor intact; SILT distally;BCR distally;     Diagnostic Findings:   Significant Labs:   Recent Lab Results  (Last 5 results in the past 72 hours)        06/09/24  0658   06/09/24  0636   06/08/24  1732   06/08/24  0639   06/07/24  1713        Immature Platelet Fraction       6.0         Aerobic Culture - Tissue               Anaerobe Culture               Anion Gap   7.0     12.0   12.0       BUN   28.2     24.6   18.9       BUN/CREAT RATIO   31     26   23       Calcium   9.8     9.3   9.4       Chloride   108     106   107       CO2   28     23   22       Creatinine   0.90     0.96   0.83       CRP, High Sensitivity 4.21       8.84         eGFR   58     54   >60       Glucose   110     130   114       GRAM STAIN               Hematocrit       34.0         Hemoglobin       10.7         Magnesium    2.10     2.00         MCH       27.6         MCHC       31.5         MCV       87.9         MPV       12.0         nRBC       0.0         Platelet Count       127         Potassium   4.4     4.3   3.9       RBC       3.87         RDW       15.7         Sodium   143     141   141       Vancomycin, Random     12.2           WBC       2.68                                 Assessment/Plan:     Active Diagnoses:    Diagnosis Date Noted POA    PRINCIPAL PROBLEM:  Cellulitis of left hand [L03.114] 06/07/2024 Yes      Problems Resolved During this Admission:         PLAN:   6/7/2024: I&D Left wrist  -continue abx  -f/u cultures  -transition to oral abx  -f/u millan 10 days

## 2024-06-09 NOTE — PT/OT/SLP PROGRESS
"Occupational Therapy   Treatment    Name: Yoli Villarreal  MRN: [id number]  Admitting Diagnosis:  Cellulitis of left hand  2 Days Post-Op    Recommendations:     Discharge Recommendations: Low Intensity Therapy  Discharge Equipment Recommendations:  none  Barriers to discharge:   none     Assessment:     Yoli Villarreal is a 89 y.o. female with a medical diagnosis of Cellulitis of left hand.  She presents with "a shocking pain that came and went" in L hand. Then no other c/o pain. . Performance deficits affecting function are weakness, gait instability, impaired functional mobility, decreased upper extremity function, impaired skin, impaired fine motor, impaired coordination, pain.     Rehab Prognosis:  Good; patient would benefit from acute skilled OT services to address these deficits and reach maximum level of function.       Plan:     Patient to be seen daily (QD) to address the above listed problems via self-care/home management, therapeutic activities, therapeutic exercises  Plan of Care Expires: 06/14/24  Plan of Care Reviewed with: patient, daughter    Subjective     Chief Complaint: No complaints  Patient/Family Comments/goals: Per daughter, " I have a sitter hired and they help dress her at the Assisted Living".   Pain/Comfort:   A brief "shocking pain that came and went"     Objective:     Communicated with: THEO Bocanegra prior to session.  Patient found HOB elevated with   upon OT entry to room.    General Precautions: Standard, fall    Orthopedic Precautions:N/A  Braces: N/A  Respiratory Status: Room air     Occupational Performance:     Bed Mobility:    Patient completed Rolling/Turning to Left with  independence  Patient completed Supine to Sit with independence and B feet do not touch floor ; Pt. C good sitting balance at EOB      Functional Mobility/Transfers:  Patient completed Sit <> Stand Transfer with stand by assistance  with  4 wheeled walker ; Pt. Required v/c's to lock wheels and push off of " "bed to stand  Patient completed Bed <> Chair Transfer using stand pivot  technique with SBA with rollator and v/c's to reach back for safety. Pt. Left up in recliner c B feet on pillow   Functional Mobility: SBA-Touch A c 4 wheel RW ; Pt. Asked if RW was locked.     Activities of Daily Living:  Upper Body Dressing: contact guard assistance to thread L UE 2* bulky dressing and to hook bra  Lower Body Dressing: contact guard assistance and reacher to thread pants over  socks      AMPAC 6 Click ADL:      Treatment & Education:  Educated on AE for LB DSG ; Pt. Would benefit from additional practice c AE however Dtr states "they help her with dressing at [facility]"    Patient left up in chair with call button in sapna, THEO Bocanegra  notified, and daughter  present    GOALS:   Multidisciplinary Problems       Occupational Therapy Goals          Problem: Occupational Therapy    Goal Priority Disciplines Outcome Interventions   Occupational Therapy Goal     OT, PT/OT Progressing    Description: Pt to perform UBD Touch A by d/c. MET  Pt to perform LBD Touch A by d/c. MET c AE (reacher)   Pt to perform toileting touch A by d/c. MET  Pt to perform toilet transfer Touch A by d/c. MET                       Time Tracking:     OT Date of Treatment: 06/09/24  OT Start Time: 1130  OT Stop Time: 1200  OT Total Time (min): 30 min    Billable Minutes:Self Care/Home Management 30               6/9/2024  "

## 2024-06-09 NOTE — DISCHARGE INSTRUCTIONS
[facility]  [address] [place] [address] [address]  [place] [address]  Phone [phone]       /      Fax [phone]  SURGEON: Dr. Bolaños    After discharge, all questions or concerns should be handled at your surgeon's office ([phone]). If it is a weekend or after hours, you will get the surgeon on call.     Discharge Medications:    PAIN MANAGEMENT: Next Dose Available   Ultram/Tramadol 50mg (Pain Med) - 1/2 to 1 tablet every 4-6 hours AS NEEDED for pain Anytime as needed on 6/9/24         Discharge Instructions  MEDICATIONS:  For best wound healing, NO anti-inflammatory medications (Diclofenac/Voltaren, Mobic/Meloxicam, Naproxen, Ibuprofen, Aleve, Advil, Motrin...etc), unless otherwise instructed by your surgeon.     PAIN MANAGEMENT:   Ultram/Tramadol 50 mg (pain pill)- take 1/2 to 1 tablet every 4-6 hours as needed for pain.       BLOOD CLOT PREVENTION:   WALK AROUND EVERY 2 HOURS.   Stay out of bed as much as possible    CONSTIPATION PREVENTION:   Miralax or Senokot S/Fanta-colace and Stool softeners every day while on pain meds.  Use other MORE AGGRESSIVE over the counter LAXATIVES as needed for constipation (Examples - Milk of Magnesia, Dulcolax suppositories, Magnesium Citrate, Fleet's enemas...etc).  Drink lots of water  Increase Fiber in diet  Increase walking distance each day - every 2 hours, at least.     ACTIVITY:   Weight bearing precautions as follows: NON weight bearing to operative extremity. No heavy lifting, bending, pushing, or pulling.  We encourage out of bed activities.  Walk around at least every 1-2 hours and switch positions throughout the day.     WOUND CARE:   Rolled gauze and ace wrap to left hand/wrist. Start dressing change on 6/11/2024. Change dressing every other day and as needed for soiling.   NOTIFY YOUR SURGEON OF EXCESSIVE WOUND DRAINAGE.  DO NOT WET WOUND or apply any ointments, creams, lotions or antiseptics.      URINARY RETENTION:  If you start  having difficulty urinating, call your primary care doctor or urologist.     PNEUMONIA PREVENTION:  Stay out of bed as much as possible, walk around at least every 2 hours and continue breathing exercises (Incentive Spirometry) as long as you are limited in mobility and on narcotics.     INFECTION PREVENTION:  Continue antibiotics as prescribed.  Wound care instructions as above.   DO NOT WET YOUR DRESSING/WOUND(S).   NOTIFY YOUR SURGEON OF EXCESSIVE WOUND DRAINAGE.  Use gloves and practice good handwashing before and after dressing changes.  No pets in bed or near wound (s)/dressing(s).  No Alcohol, smoking or tobacco products  IF YOU ARE DIABETIC, maintain good blood sugar control throughout your recovery.    Call your SURGEON'S OFFICE if you experience the following signs and symptoms of infection:   Unusual redness, swelling, excessive, cloudy or foul smelling drainage at the incision site.   Persistent temp greater than 102 F, unrelieved by Tylenol  Pain at surgical site, unrelieved by pain meds  Warning signs of a blood clot in your leg: (CALL YOUR DOCTOR)  New onset or Increasing pain in calf, new onset tenderness or redness above or below the knee or increasing swelling of your calf, ankle, or foot.  Warning signs that a blood clot has traveled to your lungs: (REPORT TO THE ER)  Sudden or increase in Shortness of breath, sudden onset of chest pains, or  Localized chest pain with coughing.     For emergencies, please report to OUR ([facility] main [facility]) Emergency department and tell them to call Dr. Bolaños at [phone].

## 2024-06-09 NOTE — PLAN OF CARE
Problem: Occupational Therapy  Goal: Occupational Therapy Goal  Description: Pt to perform UBD Touch A by d/c. MET  Pt to perform LBD Touch A by d/c. MET c AE (reacher)   Pt to perform toileting touch A by d/c. MET  Pt to perform toilet transfer Touch A by d/c. MET  Outcome: Progressing

## 2024-06-09 NOTE — NURSING
Discharge instructions provided to patient with daughter at bedside. Patient verbalized understanding and had no follow up questions.

## 2024-06-10 ENCOUNTER — TELEPHONE (OUTPATIENT)
Dept: ORTHOPEDICS | Facility: CLINIC | Age: 89
End: 2024-06-10
Payer: MEDICARE

## 2024-06-10 DIAGNOSIS — L03.114 CELLULITIS OF LEFT UPPER EXTREMITY: Primary | ICD-10-CM

## 2024-06-10 LAB
BACTERIA SPEC ANAEROBE CULT: NORMAL

## 2024-06-10 NOTE — TELEPHONE ENCOUNTER
I received a message from Yasmin (RN case manager) about home health orders.     I called the patients home health agency ([facility]) and spoke to Bella (RN). I informed her that they can resume home health and do daily dressing changes starting 6/11/24.     I also faxed over the order for daily dressing changes to [phone].     I spoke to the patients daughter and scheduled a post-op appointment. I also explained that home health is going to do daily dressing changes starting 6/11/24.    Patients daughter (healthcare power of ) voiced a clear understanding.

## 2024-06-10 NOTE — PLAN OF CARE
Pt discharged yesterday. MD/ nurse referral to arrange HH.   Angelito bee pt's caregiver/ daughter - Jose Saleh [phone] via phone-- std pt current with [facility] [place]; requesting resumption orders.      notified Dr Bolaños office nurse ( Nicole); std she will fax orders to [facility] with wd care orders.

## 2024-06-11 ENCOUNTER — TELEPHONE (OUTPATIENT)
Dept: ORTHOPEDICS | Facility: CLINIC | Age: 89
End: 2024-06-11
Payer: MEDICARE

## 2024-06-11 DIAGNOSIS — L03.114 CELLULITIS OF LEFT UPPER EXTREMITY: Primary | ICD-10-CM

## 2024-06-11 RX ORDER — CEPHALEXIN 500 MG/1
500 CAPSULE ORAL EVERY 12 HOURS
Qty: 14 CAPSULE | Refills: 0 | Status: SHIPPED | OUTPATIENT
Start: 2024-06-11

## 2024-06-11 NOTE — TELEPHONE ENCOUNTER
Patients daughter called stating that the patient is having problems with diarrhea. Daughter believes it is from the antibiotics. She is concerned that the patient will dehydrate. She is 89 years old and hard for them to get her to drink and eat enough.

## 2024-06-12 ENCOUNTER — PATIENT OUTREACH (OUTPATIENT)
Dept: ADMINISTRATIVE | Facility: CLINIC | Age: 89
End: 2024-06-12
Payer: MEDICARE

## 2024-06-12 LAB
BACTERIA TISS AEROBE CULT: NORMAL

## 2024-06-12 NOTE — PROGRESS NOTES
C3 nurse spoke with Yoli Garcia for a TCC post hospital discharge follow up call. Pt daughter stated pt is currently at Assisted living facility and has a nurse dispensing medications. Pt no longer eligible for TCC follow up call.

## 2024-06-12 NOTE — TELEPHONE ENCOUNTER
I called the patients daughter and informed her.     Patient started taking the new medication last night. Daughter states that the sitter told her she was doing well and improving.     They will call with any questions or concerns.

## 2024-06-14 ENCOUNTER — TELEPHONE (OUTPATIENT)
Dept: ORTHOPEDICS | Facility: CLINIC | Age: 89
End: 2024-06-14
Payer: MEDICARE

## 2024-06-14 NOTE — TELEPHONE ENCOUNTER
Patients daughter (avtar - healthcare power of ) called and informed me that patient has started the new antibiotic but now has thrush. Patient has had this before and they treated it with Nystatin.     Daughter states that she is going to try that.     On Wednesday when I spoke to the patients daughter I informed her that if it doesn't help, gets worse, or have any questions to call us back on Thursday afternoon or Friday morning before 12:00 PM since we close at noon.     Patients daughter voiced a clear understanding.

## 2024-06-19 ENCOUNTER — OFFICE VISIT (OUTPATIENT)
Dept: ORTHOPEDICS | Facility: CLINIC | Age: 89
End: 2024-06-19
Payer: MEDICARE

## 2024-06-19 VITALS — SYSTOLIC BLOOD PRESSURE: 171 MMHG | HEART RATE: 66 BPM | DIASTOLIC BLOOD PRESSURE: 89 MMHG

## 2024-06-19 DIAGNOSIS — M79.89 SWELLING OF LEFT HAND: Primary | ICD-10-CM

## 2024-06-19 PROCEDURE — 99024 POSTOP FOLLOW-UP VISIT: CPT | Mod: POP,,, | Performed by: STUDENT IN AN ORGANIZED HEALTH CARE EDUCATION/TRAINING PROGRAM

## 2024-06-19 NOTE — PROGRESS NOTES
Postop Clinic Note    Surgery:  Left forearm drainage of abscess 2024    History of present illness:    Patient is doing well.  She has no pain today.  No fevers or chills.  The micro has not grown any organisms.  She was done with her antibiotics.      Past Surgical History:   Procedure Laterality Date    ADENOIDECTOMY      APPENDECTOMY       SECTION      EYE SURGERY  ?    FRACTURE SURGERY  ?    HYSTERECTOMY      IRRIGATION AND DEBRIDEMENT OF UPPER EXTREMITY Left 2024    Procedure: IRRIGATION AND DEBRIDEMENT, UPPER EXTREMITY;  Surgeon: Miquel Bolaños MD;  Location: [facility];  Service: Orthopedics;  Laterality: Left;    NEPHRECTOMY      TONSILLECTOMY             Examination:    Vital Signs:    Vitals:    24 1004   BP: (!) 171/89   Pulse: 66       There is no height or weight on file to calculate BMI.    Constitution:   Well-developed, well nourished patient in no acute distress.  Neurological:   Alert and oriented x 3 and cooperative to examination.     Psychiatric/Behavior: Normal mental status.  Respiratory:   No shortness of breath.  Eyes:    Extraoccular muscles intact  Skin:    No scars, rash or suspicious lesions.    MSK:   Left upper extremity: Surgical incision of the volar and dorsal aspect of the wrist is healing well with sutures in place no evidence of dehiscence or infection.  No tenderness to palpation over the wrist or forearm.  She was able to flex and extend her digits.  Sensation light touch intact in median ulnar radial distribution.  Radial pulse 2 +hand is warm well perfused    Imaging:   No new imaging    CRP has been trending down in his now 4.21.  Cultures have not grown any organisms     Assessment:  Status post left wrist drainage    Plan:  She is doing well.  Remove the sutures today.  She can work on exercises at home.  She no longer needs any antibiotics.  Cultures have not grown any organisms.  I can see her back as needed if she has any issues    Follow Up:  As  needed  Xray at next visit:  None

## 2024-07-01 ENCOUNTER — DOCUMENT SCAN (OUTPATIENT)
Dept: HOME HEALTH SERVICES | Facility: HOSPITAL | Age: 89
End: 2024-07-01
Payer: MEDICARE

## 2024-07-03 ENCOUNTER — DOCUMENT SCAN (OUTPATIENT)
Dept: HOME HEALTH SERVICES | Facility: HOSPITAL | Age: 89
End: 2024-07-03
Payer: MEDICARE

## 2024-10-01 ENCOUNTER — OFFICE VISIT (OUTPATIENT)
Dept: URGENT CARE | Facility: CLINIC | Age: 89
End: 2024-10-01
Payer: MEDICARE

## 2024-10-01 VITALS
RESPIRATION RATE: 18 BRPM | HEART RATE: 67 BPM | HEIGHT: 58 IN | WEIGHT: 120 LBS | SYSTOLIC BLOOD PRESSURE: 165 MMHG | TEMPERATURE: 98 F | BODY MASS INDEX: 25.19 KG/M2 | DIASTOLIC BLOOD PRESSURE: 68 MMHG

## 2024-10-01 DIAGNOSIS — H92.02 EAR PAIN, LEFT: Primary | ICD-10-CM

## 2024-10-01 PROCEDURE — 99213 OFFICE O/P EST LOW 20 MIN: CPT | Mod: ,,, | Performed by: FAMILY MEDICINE

## 2024-10-01 RX ORDER — AMOXICILLIN 875 MG/1
875 TABLET, FILM COATED ORAL EVERY 12 HOURS
Qty: 14 TABLET | Refills: 0 | Status: SHIPPED | OUTPATIENT
Start: 2024-10-01 | End: 2024-10-08

## 2024-10-01 RX ORDER — PREDNISONE 10 MG/1
10 TABLET ORAL DAILY
Qty: 3 TABLET | Refills: 0 | Status: SHIPPED | OUTPATIENT
Start: 2024-10-01 | End: 2024-10-04

## 2024-10-01 RX ORDER — DOXAZOSIN 1 MG/1
1 TABLET ORAL NIGHTLY
COMMUNITY

## 2024-10-01 NOTE — PATIENT INSTRUCTIONS
Medication sent to pharmacy, take as prescribed  Apply heat for 10-15 minutes a few times a day  Do not put anything into the ear  Avoid touching the area as this may make the tenderness worse  Monitor for fever  Call or seek medical attention if you develop any new or worrisome symptoms that arise at home, or if you do not get better as expected

## 2024-10-01 NOTE — PROGRESS NOTES
"Subjective:      Patient ID: Yoli Villarreal is a 89 y.o. female.    Vitals:  height is 4' 10" (1.473 m) and weight is 54.4 kg (120 lb). Her oral temperature is 98.4 °F (36.9 °C). Her blood pressure is 165/68 (abnormal) and her pulse is 67. Her respiration is 18.     Chief Complaint: Otalgia     Patient is a 89 y.o. female who presents to urgent care with complaints of of left ear pain  started since Yesterday.Alleviating factors include Tylenol and warm compresses with mild amount of relief.  Denies fever, ear drainage, or hearing changes.  She reports that there is an area of tenderness directly behind the ear that hurts when touched.       Constitution: Negative for chills, sweating, fatigue and fever.   HENT:  Positive for ear pain. Negative for ear discharge, tinnitus, congestion, sinus pain, sinus pressure, sore throat and trouble swallowing.    Neck: neck negative.   Cardiovascular: Negative.    Eyes: Negative.    Respiratory: Negative.     Gastrointestinal: Negative.    Endocrine: negative.   Genitourinary: Negative.    Musculoskeletal: Negative.    Skin: Negative.    Allergic/Immunologic: Negative.    Neurological: Negative.  Negative for disorientation and altered mental status.   Hematologic/Lymphatic: Negative.    Psychiatric/Behavioral:  Negative for altered mental status, disorientation and confusion.       Objective:     Physical Exam   Constitutional: She is oriented to person, place, and time. She appears well-developed. She is cooperative.  Non-toxic appearance. She does not appear ill. No distress.   HENT:   Head: Normocephalic and atraumatic.       Ears:   Right Ear: Hearing, tympanic membrane, external ear and ear canal normal.   Left Ear: Hearing, tympanic membrane, external ear and ear canal normal.      Comments: Tenderness to the left posterior auricular lymph node  Nose: Nose normal. No mucosal edema, rhinorrhea or nasal deformity. No epistaxis. Right sinus exhibits no maxillary sinus " tenderness and no frontal sinus tenderness. Left sinus exhibits no maxillary sinus tenderness and no frontal sinus tenderness.   Mouth/Throat: Uvula is midline, oropharynx is clear and moist and mucous membranes are normal. No trismus in the jaw. Normal dentition. No uvula swelling. No oropharyngeal exudate, posterior oropharyngeal edema or posterior oropharyngeal erythema.   Eyes: Conjunctivae and lids are normal. No scleral icterus.   Neck: Trachea normal and phonation normal. Neck supple. No edema present. No erythema present. No neck rigidity present.   Cardiovascular: Normal rate, regular rhythm, normal heart sounds and normal pulses.   Pulmonary/Chest: Effort normal and breath sounds normal. No respiratory distress. She has no decreased breath sounds. She has no rhonchi.   Abdominal: Normal appearance.   Musculoskeletal: Normal range of motion.         General: No deformity. Normal range of motion.   Neurological: She is alert and oriented to person, place, and time. She exhibits normal muscle tone. Coordination normal.   Skin: Skin is warm, dry, intact, not diaphoretic and not pale.   Psychiatric: Her speech is normal and behavior is normal. Judgment and thought content normal.   Nursing note and vitals reviewed.         Previous History      Review of patient's allergies indicates:   Allergen Reactions    Iodine      IVP reaction    Shellfish containing products        Past Medical History:   Diagnosis Date    Acid reflux     High cholesterol     HTN (hypertension)     Skin cancer     Unspecified macular degeneration      Current Outpatient Medications   Medication Instructions    amoxicillin (AMOXIL) 875 mg, Oral, Every 12 hours    atorvastatin (LIPITOR) 10 mg, Nightly    cephALEXin (KEFLEX) 500 mg, Oral, Every 12 hours    diltiaZEM (TIAZAC) 360 mg, Daily    doxazosin (CARDURA) 1 mg, Nightly    furosemide (LASIX) 10 mg/mL (alcohol free) solution Take by mouth. Dosage unsure    meclizine (ANTIVERT) 5 mg,  "Every 12 hours PRN    meloxicam (MOBIC) 7.5 mg, 2 times daily    pantoprazole (PROTONIX) 20 mg, 2 times daily before meals    predniSONE (DELTASONE) 10 mg, Oral, Daily    sertraline (ZOLOFT) 100 mg, Nightly     Past Surgical History:   Procedure Laterality Date    ADENOIDECTOMY      APPENDECTOMY       SECTION      EYE SURGERY  ?    FRACTURE SURGERY  ?    HYSTERECTOMY      IRRIGATION AND DEBRIDEMENT OF UPPER EXTREMITY Left 2024    Procedure: IRRIGATION AND DEBRIDEMENT, UPPER EXTREMITY;  Surgeon: Miquel Bolaños MD;  Location: [facility];  Service: Orthopedics;  Laterality: Left;    NEPHRECTOMY      TONSILLECTOMY       Family History   Problem Relation Name Age of Onset    Ovarian cancer Mother JESSICA Goyal     Cancer Mother JESSICA Goyal     No Known Problems Father      No Known Problems Sister      No Known Problems Brother         Social History     Tobacco Use    Smoking status: Never    Smokeless tobacco: Never   Substance Use Topics    Alcohol use: Never    Drug use: Never        Physical Exam      Vital Signs Reviewed   BP (!) 165/68 Comment: right arm  Pulse 67   Temp 98.4 °F (36.9 °C) (Oral)   Resp 18   Ht 4' 10" (1.473 m)   Wt 54.4 kg (120 lb)   PF 95 L/min   BMI 25.08 kg/m²        Procedures    Procedures     Labs     Results for orders placed or performed during the hospital encounter of 24   Anaerobic Culture    Collection Time: 24  2:33 PM    Specimen: Wrist, Left; Tissue   Result Value Ref Range    Anaerobe Culture No Anaerobes Isolated    Anaerobic Culture    Collection Time: 24  2:33 PM    Specimen: Wrist, Left; Tissue   Result Value Ref Range    Anaerobe Culture No Anaerobes Isolated    Fungal Culture    Collection Time: 24  2:33 PM    Specimen: Wrist, Left; Tissue   Result Value Ref Range    Fungal Culture No Fungus Isolated at 4 Weeks    Fungal Culture    Collection Time: 24  2:33 PM    Specimen: Wrist, Left; Tissue   Result Value Ref Range    Fungal " Culture No Fungus Isolated at 4 Weeks    Gram Stain    Collection Time: 06/07/24  2:33 PM    Specimen: Wrist, Left; Tissue   Result Value Ref Range    GRAM STAIN Rare WBC observed     GRAM STAIN No bacteria seen    Gram Stain    Collection Time: 06/07/24  2:33 PM    Specimen: Wrist, Left; Tissue   Result Value Ref Range    GRAM STAIN Few WBC observed     GRAM STAIN No bacteria seen    Tissue Culture - Aerobic    Collection Time: 06/07/24  2:33 PM    Specimen: Wrist, Left; Tissue   Result Value Ref Range    Tissue - Aerobic Culture No Growth at 5 days    Tissue Culture - Aerobic    Collection Time: 06/07/24  2:33 PM    Specimen: Wrist, Left; Tissue   Result Value Ref Range    Tissue - Aerobic Culture No Growth at 5 days    Anaerobic Culture    Collection Time: 06/07/24  2:36 PM    Specimen: Wrist, Left; Tissue   Result Value Ref Range    Anaerobe Culture No Anaerobes Isolated    Fungal Culture    Collection Time: 06/07/24  2:36 PM    Specimen: Wrist, Left; Tissue   Result Value Ref Range    Fungal Culture No Fungus Isolated at 4 Weeks    Gram Stain    Collection Time: 06/07/24  2:36 PM    Specimen: Wrist, Left; Tissue   Result Value Ref Range    GRAM STAIN Few WBC observed     GRAM STAIN No bacteria seen    Tissue Culture - Aerobic    Collection Time: 06/07/24  2:36 PM    Specimen: Wrist, Left; Tissue   Result Value Ref Range    Tissue - Aerobic Culture No Growth at 5 days    Basic Metabolic Panel    Collection Time: 06/07/24  5:13 PM   Result Value Ref Range    Sodium 141 136 - 145 mmol/L    Potassium 3.9 3.5 - 5.1 mmol/L    Chloride 107 98 - 111 mmol/L    CO2 22 (L) 23 - 31 mmol/L    Glucose 114 75 - 121 mg/dL    Blood Urea Nitrogen 18.9 9.8 - 20.1 mg/dL    Creatinine 0.83 0.55 - 1.02 mg/dL    BUN/Creatinine Ratio 23     Calcium 9.4 8.4 - 10.2 mg/dL    Anion Gap 12.0 mEq/L    eGFR >60 mL/min/1.73/m2   Basic Metabolic Panel    Collection Time: 06/08/24  6:39 AM   Result Value Ref Range    Sodium 141 136 - 145 mmol/L     Potassium 4.3 3.5 - 5.1 mmol/L    Chloride 106 98 - 111 mmol/L    CO2 23 23 - 31 mmol/L    Glucose 130 (H) 75 - 121 mg/dL    Blood Urea Nitrogen 24.6 (H) 9.8 - 20.1 mg/dL    Creatinine 0.96 0.55 - 1.02 mg/dL    BUN/Creatinine Ratio 26     Calcium 9.3 8.4 - 10.2 mg/dL    Anion Gap 12.0 mEq/L    eGFR 54 mL/min/1.73/m2   Magnesium    Collection Time: 06/08/24  6:39 AM   Result Value Ref Range    Magnesium Level 2.00 1.60 - 2.60 mg/dL   CBC Without Differential    Collection Time: 06/08/24  6:39 AM   Result Value Ref Range    WBC 2.68 (L) 4.50 - 11.50 x10(3)/mcL    RBC 3.87 (L) 4.20 - 5.40 x10(6)/mcL    Hgb 10.7 (L) 12.0 - 16.0 g/dL    Hct 34.0 (L) 37.0 - 47.0 %    MCV 87.9 80.0 - 94.0 fL    MCH 27.6 27.0 - 31.0 pg    MCHC 31.5 (L) 33.0 - 36.0 g/dL    RDW 15.7 11.5 - 17.0 %    Platelet 127 (L) 130 - 400 x10(3)/mcL    MPV 12.0 (H) 7.4 - 10.4 fL    IPF 6.0 0.9 - 11.2 %    NRBC% 0.0 %   CRP, High Sensitivity    Collection Time: 06/08/24  6:39 AM   Result Value Ref Range    C-Reactive Protein High Sensitivity 8.84 (H) <=5.00 mg/L   Vancomycin, Random    Collection Time: 06/08/24  5:32 PM   Result Value Ref Range    Vancomycin Random 12.2 (L) 15.0 - 20.0 ug/ml   Basic Metabolic Panel    Collection Time: 06/09/24  6:36 AM   Result Value Ref Range    Sodium 143 136 - 145 mmol/L    Potassium 4.4 3.5 - 5.1 mmol/L    Chloride 108 98 - 111 mmol/L    CO2 28 23 - 31 mmol/L    Glucose 110 75 - 121 mg/dL    Blood Urea Nitrogen 28.2 (H) 9.8 - 20.1 mg/dL    Creatinine 0.90 0.55 - 1.02 mg/dL    BUN/Creatinine Ratio 31     Calcium 9.8 8.4 - 10.2 mg/dL    Anion Gap 7.0 mEq/L    eGFR 58 mL/min/1.73/m2   Magnesium    Collection Time: 06/09/24  6:36 AM   Result Value Ref Range    Magnesium Level 2.10 1.60 - 2.60 mg/dL   CRP, High Sensitivity    Collection Time: 06/09/24  6:58 AM   Result Value Ref Range    C-Reactive Protein High Sensitivity 4.21 <=5.00 mg/L      Assessment:     1. Ear pain, left        Plan:   There is tenderness to the  left postauricular lymph node.  There is no evidence of otitis media or otitis externa.  Plan to begin amoxicillin and low-dose short course of prednisone.  She has been instructed to avoid touching and pushing on the area, as this may make the tenderness worse.  Return precautions reviewed.  Patient and daughter verbalized understanding.      Medication sent to pharmacy, take as prescribed  Apply heat for 10-15 minutes a few times a day  Do not put anything into the ear  Avoid touching the area as this may make the tenderness worse  Monitor for fever  Call or seek medical attention if you develop any new or worrisome symptoms that arise at home, or if you do not get better as expected    Ear pain, left    Other orders  -     amoxicillin (AMOXIL) 875 MG tablet; Take 1 tablet (875 mg total) by mouth every 12 (twelve) hours. for 7 days  Dispense: 14 tablet; Refill: 0  -     predniSONE (DELTASONE) 10 MG tablet; Take 1 tablet (10 mg total) by mouth once daily. for 3 days  Dispense: 3 tablet; Refill: 0

## 2024-10-27 NOTE — PROGRESS NOTES
[facility]  [facility] Medicine Progress Note      Patient Name: Yoli Villarreal  MRN: [id number]  Admission Date: 6/7/2024   Length of Stay: 1  Attending Physician: Roberto Roque MD  Primary Care Provider: Nacho Kulkarni MD  Face-to-Face encounter date: 06/08/2024    Code Status: Full Code        Chief Complaint:   Joint Swelling        HPI:   Yoli Villarreal is a 89 y.o. female who  has a past medical history of Acid reflux, High cholesterol, HTN (hypertension), Skin cancer, and Unspecified macular degeneration.. The patient presented to [facility] on 6/7/2024 with a primary complaint of left hand pain due to a suspected infection. She was evaluated by Dr. Bolaños in clinic today. Her pain was worsening and she had redness tracking up her forearm so he took her to the OR for a washout. I was asked to admit the patient for ongoing monitoring. She doesn't recall an inciting event that caused the infection. Denies history of cellulitis or skin abscess. Her daughter is at the bedside.      Overview/Hospital Course:  No notes on file       Interval Hx:   The pt is in her chair, she has no c/o. Her pain is controlled.  Pt's daughter updated on tx plan.    Review of Systems   All other systems reviewed and are negative.      Objective/physical exam:  General: In no acute distress, afebrile  Chest: Clear to auscultation bilaterally  Heart: RRR, +S1, S2, no appreciable murmur  Abdomen: Soft, nontender, BS +  MSK: Left wrist, hand bandaged. Warm, no lower extremity edema, no clubbing or cyanosis  Neurologic: Alert and oriented x4  Psych/mental status: Appropriate mood and affect. Responds appropriately to questions.     VITAL SIGNS: 24 HRS MIN & MAX LAST   Temp  Min: 97.7 °F (36.5 °C)  Max: 99.8 °F (37.7 °C) 98.4 °F (36.9 °C)   BP  Min: 128/63  Max: 163/61 (!) 136/55   Pulse  Min: 60  Max: 81  69   Resp  Min: 16  Max: 20 18   SpO2  Min: 85 %  Max: 98 % 98 %       Recent Labs  Inpatient Plan:  - Check BG q6h while NPO/on tube feeds   - C/w Lantus 18u @0600  - D/c Admelog 22u at 0600   - Adjust Admelog to 25u at 1200 and 13u at 1800 while on continuous TFs (HOLD if tube feeds held and HOLD 1800 dose if TFs are done at 1800 (6P))  - C/w moderate dose Admelog correctional scale q6h while on TFs/NPO/overnight     Discharge Plan:  - TBD depending on insulin requirement and discharge tube feeding regimen (Bolus vs continuous tube feeding)   - If bolus tube feeding > Lantus plus Humalog   - Patient should have BGs checked 4x a day while on TFs. Please tell patient to contact Endocrinologist if BG <70mg/dL x1 or >200mg/dL consistently or >400mg/dL x1.   - Followup with Dr Ruth: [facility]: [address] [place] [address]. [address]. [place] [address]. Tel: [phone]- [place]- daughter to schedule.     Pt will need RX for basal insulin pen (ie. Basaglar, Lantus, Tresiba, Toujeo, Levemir) and bolus insulin pen (ie. humalog/novolog/admelog) depending on insurance coverage; please send test scripts to see which is covered. Please send prescriptions for diabetes supplies (glucometer, test strips, lancets, alcohol swabs, insulin pen needles).   Lab 06/05/24  1525 06/08/24  0639   WBC 5.40 2.68*   RBC 4.03* 3.87*   HGB 11.6* 10.7*   HCT 35.2* 34.0*   MCV 87.3 87.9   MCH 28.8 27.6   MCHC 33.0 31.5*   RDW 16.0 15.7   * 127*   MPV 11.9* 12.0*       Recent Labs   Lab 06/07/24  1713 06/08/24  0639    141   K 3.9 4.3    106   CO2 22* 23   BUN 18.9 24.6*   CREATININE 0.83 0.96   CALCIUM 9.4 9.3   MG  --  2.00        Microbiology Results (last 7 days)       Procedure Component Value Units Date/Time    Gram Stain [[id number]] Collected: 06/07/24 1433    Order Status: Completed Specimen: Tissue from Wrist, Left Updated: 06/08/24 1114     GRAM STAIN Rare WBC observed      No bacteria seen    Gram Stain [[id number]] Collected: 06/07/24 1436    Order Status: Completed Specimen: Tissue from Wrist, Left Updated: 06/08/24 1113     GRAM STAIN Few WBC observed      No bacteria seen    Gram Stain [[id number]] Collected: 06/07/24 1433    Order Status: Completed Specimen: Tissue from Wrist, Left Updated: 06/08/24 1112     GRAM STAIN Few WBC observed      No bacteria seen    Tissue Culture - Aerobic [[id number]] Collected: 06/07/24 1433    Order Status: Completed Specimen: Tissue from Wrist, Left Updated: 06/08/24 0634     Tissue - Aerobic Culture No Growth At 24 Hours    Tissue Culture - Aerobic [[id number]] Collected: 06/07/24 1433    Order Status: Completed Specimen: Tissue from Wrist, Left Updated: 06/08/24 0633     Tissue - Aerobic Culture No Growth At 24 Hours    Tissue Culture - Aerobic [[id number]] Collected: 06/07/24 1436    Order Status: Completed Specimen: Tissue from Wrist, Left Updated: 06/08/24 0632     Tissue - Aerobic Culture No Growth At 24 Hours    Anaerobic Culture [[id number]] Collected: 06/07/24 1436    Order Status: Sent Specimen: Tissue from Wrist, Left Updated: 06/07/24 1534    Fungal Culture [[id number]] Collected: 06/07/24 1436    Order Status: Sent Specimen: Tissue from Wrist, Left Updated: 06/07/24 1534    Anaerobic Culture  [[id number]] Collected: 06/07/24 1433    Order Status: Sent Specimen: Tissue from Wrist, Left Updated: 06/07/24 1534    Fungal Culture [[id number]] Collected: 06/07/24 1433    Order Status: Sent Specimen: Tissue from Wrist, Left Updated: 06/07/24 1534    Anaerobic Culture [[id number]] Collected: 06/07/24 1433    Order Status: Sent Specimen: Tissue from Wrist, Left Updated: 06/07/24 1534    Fungal Culture [[id number]] Collected: 06/07/24 1433    Order Status: Sent Specimen: Tissue from Wrist, Left Updated: 06/07/24 1534    Gram Stain [[id number]]     Order Status: Sent Specimen: Tissue from Wrist, Left     Anaerobic Culture [[id number]]     Order Status: Sent Specimen: Tissue from Wrist, Left     Tissue Culture - Aerobic [[id number]]     Order Status: Sent Specimen: Tissue from Wrist, Left     Fungal Culture [[id number]]     Order Status: Sent Specimen: Tissue from Wrist, Left     Gram Stain [[id number]]     Order Status: Sent Specimen: SWAB from Wrist, Left     Anaerobic Culture [[id number]]     Order Status: Sent Specimen: SWAB from Wrist, Left     Tissue Culture - Aerobic [[id number]]     Order Status: Sent Specimen: Tissue from Wrist, Left     Fungal Culture [[id number]]     Order Status: Sent Specimen: SWAB from Wrist, Left     Gram Stain [[id number]] Collected: 06/07/24 1446    Order Status: Canceled Specimen: SWAB from Wrist, Left     Anaerobic Culture [[id number]] Collected: 06/07/24 1446    Order Status: Canceled Specimen: SWAB from Wrist, Left     Tissue Culture - Aerobic [[id number]] Collected: 06/07/24 1446    Order Status: Canceled Specimen: Tissue from Wrist, Left     Fungal Culture [[id number]] Collected: 06/07/24 1446    Order Status: Canceled Specimen: SWAB from Wrist, Left              Radiology:  MRI Forearm Without Contrast Left  Narrative: EXAMINATION:  MRI WRIST WITHOUT CONTRAST LEFT; MRI FOREARM WITHOUT CONTRAST LEFT    CLINICAL HISTORY:  89-year-old woman with left hand/wrist pain and  suspected soft tissue infection.    TECHNIQUE:  Axial PD, axial T2 fat sat, coronal T2 fat sat, coronal T1, sagittal T1, and coronal T2 3D GRE non-contrast 1.5 T magnetic resonance imaging was performed through the left wrist.  Additional sagittal STIR, sagittal T1, axial PD, axial T2 fat sat, coronal T1, and coronal STIR non-contrast 1.5T magnetic resonance imaging was performed through the left forearm.    COMPARISON:  None.  Correlation is made with left hand radiographs of 06/05/2024.    FINDINGS:  There is prominent soft tissue swelling and diffuse subcutaneous edema along the visualized right thumb and radial hand/wrist along with additional more mild diffuse soft tissue swelling and subcutaneous edema at the dorsal hand/wrist and volar distal forearm and wrist.  There is no abscess, focal fluid collection, or hematoma.  There are no findings of osteomyelitis.    There is severe degenerative joint space narrowing with thin subchondral degenerative marrow change and marked marginal osteophyte formation at the thumb CMC articulation.  There is no acute joint space abnormality such as septic arthritis.  There is no significant abnormality at the radiocarpal or distal radioulnar joints.  The triangular fibrocartilage complex demonstrates mild diffuse degenerative change without tear.  The extensor tendon compartments are intact and with no significant abnormality.  The flexor tendons and median nerve through the carpal tunnel are also intact and with no significant abnormality.  There is no space-occupying lesion such as ganglion cyst at the wrist and no solid mass.  There is no muscle edema or atrophy.    The left forearm imaging is degraded by persistent patient motion.  The subcutaneous edema and mild soft tissue swelling at the volar wrist extends proximally to the mid forearm.  There is no additional pathology along the left forearm.  The radius and ulna are intact and there are no findings of osteomyelitis  at the forearm or elbow.  No acute pathology is identified at the elbow.  Impression: Diffuse soft tissue swelling and edema at the left thumb and radial wrist more advanced than dorsal hand and volar wrist and distal forearm.  No abscess.  No osteomyelitis or septic arthritis.    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    No additional acute pathology identified at the left wrist or forearm.    Electronically signed by: Teofilo Valenzuela  Date:    06/06/2024  Time:    10:26  MRI Wrist Joint Without Contrast Left  Narrative: EXAMINATION:  MRI WRIST WITHOUT CONTRAST LEFT; MRI FOREARM WITHOUT CONTRAST LEFT    CLINICAL HISTORY:  89-year-old woman with left hand/wrist pain and suspected soft tissue infection.    TECHNIQUE:  Axial PD, axial T2 fat sat, coronal T2 fat sat, coronal T1, sagittal T1, and coronal T2 3D GRE non-contrast 1.5 T magnetic resonance imaging was performed through the left wrist.  Additional sagittal STIR, sagittal T1, axial PD, axial T2 fat sat, coronal T1, and coronal STIR non-contrast 1.5T magnetic resonance imaging was performed through the left forearm.    COMPARISON:  None.  Correlation is made with left hand radiographs of 06/05/2024.    FINDINGS:  There is prominent soft tissue swelling and diffuse subcutaneous edema along the visualized right thumb and radial hand/wrist along with additional more mild diffuse soft tissue swelling and subcutaneous edema at the dorsal hand/wrist and volar distal forearm and wrist.  There is no abscess, focal fluid collection, or hematoma.  There are no findings of osteomyelitis.    There is severe degenerative joint space narrowing with thin subchondral degenerative marrow change and marked marginal osteophyte formation at the thumb CMC articulation.  There is no acute joint space abnormality such as septic arthritis.  There is no significant abnormality at the radiocarpal or distal radioulnar joints.  The triangular fibrocartilage  complex demonstrates mild diffuse degenerative change without tear.  The extensor tendon compartments are intact and with no significant abnormality.  The flexor tendons and median nerve through the carpal tunnel are also intact and with no significant abnormality.  There is no space-occupying lesion such as ganglion cyst at the wrist and no solid mass.  There is no muscle edema or atrophy.    The left forearm imaging is degraded by persistent patient motion.  The subcutaneous edema and mild soft tissue swelling at the volar wrist extends proximally to the mid forearm.  There is no additional pathology along the left forearm.  The radius and ulna are intact and there are no findings of osteomyelitis at the forearm or elbow.  No acute pathology is identified at the elbow.  Impression: Diffuse soft tissue swelling and edema at the left thumb and radial wrist more advanced than dorsal hand and volar wrist and distal forearm.  No abscess.  No osteomyelitis or septic arthritis.    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    No additional acute pathology identified at the left wrist or forearm.    Electronically signed by: Teofilo Valenzuela  Date:    06/06/2024  Time:    10:26      Scheduled Med:   diltiaZEM  360 mg Oral Daily    mupirocin   Nasal BID    pantoprazole  20 mg Oral BID AC    piperacillin-tazobactam (Zosyn) IV (PEDS and ADULTS) (extended infusion is not appropriate)  4.5 g Intravenous Q12H    sertraline  100 mg Oral QHS            Nutrition Status:      Assessment/Plan:    Left wrist infection  HTN  HLD  GERD     Plan  Cont antibiotics  F/u wound cx, neg so far  Consult therapy to assess mobility   Family request home health upon dc.  Pt lives at an [facility]      All diagnosis and differential diagnosis have been reviewed; assessment and plan has been documented; I have personally reviewed the labs and test results that are presently available; I have reviewed the patients medication list;  I have reviewed the consulting providers response and recommendations. I have reviewed or attempted to review medical records based upon their availability      _____________________________________________________________________            Roberto Roque MD   06/08/2024

## (undated) DEVICE — GOWN NONREINF SET-IN SLV XL

## (undated) DEVICE — SPONGE KERLIX SUPER 6X6.75IN

## (undated) DEVICE — ELECTRODE REM POLYHESIVE II

## (undated) DEVICE — GLOVE SENSICARE PI SURG 6.5

## (undated) DEVICE — GLOVE SENSICARE PI GRN 6.5

## (undated) DEVICE — GLOVE SENSICARE PI GRN 7.5

## (undated) DEVICE — CORD BIPOLAR 12 FOOT

## (undated) DEVICE — SUT ETHILON 8-0 BLK MONO BV

## (undated) DEVICE — DRAPE HAND STERILE

## (undated) DEVICE — BLADE SURG STAINLESS STEEL #15

## (undated) DEVICE — DRESSING XEROFORM 5X9IN

## (undated) DEVICE — GOWN SMARTGOWN 3XL XLONG

## (undated) DEVICE — PAD CAST 6X4YD SPECIALISTIC

## (undated) DEVICE — IRRIGATION SET Y-TYPE TUR/BLAD

## (undated) DEVICE — Device

## (undated) DEVICE — PADDING 4X4YD SPECIALIST100

## (undated) DEVICE — GLOVE PROTEXIS PI CRM 7

## (undated) DEVICE — COVER FULLGUARD SHOE HIGH-TOP

## (undated) DEVICE — TAPE SILK 3IN

## (undated) DEVICE — BANDAGE ESMARK LATEX FREE 4INX

## (undated) DEVICE — BANDAGE MATRIX HK LOOP 4IN 5YD